# Patient Record
Sex: MALE | Race: WHITE | NOT HISPANIC OR LATINO | Employment: FULL TIME | URBAN - METROPOLITAN AREA
[De-identification: names, ages, dates, MRNs, and addresses within clinical notes are randomized per-mention and may not be internally consistent; named-entity substitution may affect disease eponyms.]

---

## 2024-01-04 ENCOUNTER — OFFICE VISIT (OUTPATIENT)
Dept: UROLOGY | Facility: CLINIC | Age: 44
End: 2024-01-04
Payer: COMMERCIAL

## 2024-01-04 DIAGNOSIS — Z30.09 FAMILY PLANNING: Primary | ICD-10-CM

## 2024-01-04 PROCEDURE — 99202 OFFICE O/P NEW SF 15 MIN: CPT | Performed by: UROLOGY

## 2024-01-04 RX ORDER — DIAZEPAM 5 MG/1
5 TABLET ORAL EVERY 8 HOURS PRN
Qty: 1 TABLET | Refills: 0 | Status: SHIPPED | OUTPATIENT
Start: 2024-01-04 | End: 2024-01-11

## 2024-01-04 NOTE — PROGRESS NOTES
01/04/2023  We discussed scalpeless vasectomy procedure, the indications and potential side effects and complications. Patient expressed understanding and agreed to proceed the procedure.    Plan  Valium 5 mg 1 hour before procedure  Vasectomy    I have personally reviewed the OARRS report  for this patient. This report is scanned into the electronic medical record. I have considered the risks of abuse, dependence, addictions and diversions. I believe that it is a clinically appropriate for this patient to be prescribed this medication      New Patient   Chief Complaint   Patient presents with    Vas Consult        Physical Exam     TODAYS LAB RESULTS:      ASSESSMENT&PLAN:      IMPRESSIONS:

## 2024-02-02 ENCOUNTER — PROCEDURE VISIT (OUTPATIENT)
Dept: UROLOGY | Facility: CLINIC | Age: 44
End: 2024-02-02
Payer: COMMERCIAL

## 2024-02-02 VITALS — DIASTOLIC BLOOD PRESSURE: 87 MMHG | SYSTOLIC BLOOD PRESSURE: 130 MMHG

## 2024-02-02 DIAGNOSIS — Z98.52 STATUS POST VASECTOMY: Primary | ICD-10-CM

## 2024-02-02 PROCEDURE — 55250 REMOVAL OF SPERM DUCT(S): CPT | Performed by: UROLOGY

## 2024-02-02 RX ORDER — CEPHALEXIN 500 MG/1
500 CAPSULE ORAL 2 TIMES DAILY
Qty: 14 CAPSULE | Refills: 0 | Status: SHIPPED | OUTPATIENT
Start: 2024-02-02 | End: 2024-02-09

## 2024-02-02 RX ORDER — HYDROCODONE BITARTRATE AND ACETAMINOPHEN 5; 325 MG/1; MG/1
1 TABLET ORAL EVERY 6 HOURS PRN
Qty: 20 TABLET | Refills: 0 | Status: SHIPPED | OUTPATIENT
Start: 2024-02-02 | End: 2024-02-09

## 2024-02-02 NOTE — PROGRESS NOTES
02/02/2024    A scalpeless vasectomy was performed under local and patient tolerated it well.    Impression  Family planning  Status post vasectomy    Plan  Norco ×20  Keflex 500 mg twice daily ×7 days  Appointment with specimen in 10 weeks    I have personally reviewed the OARRS report for this patient. This report is scanned into the electronic medical record. I have considered the risk of abuse, dependence, addictions and diversion. I believe that it is clinically appropriate for this patient to be prescribed this medication        Chief Complaint   Patient presents with    Sterilization        Physical Exam     TODAYS LAB RESULTS:      ASSESSMENT&PLAN:      IMPRESSIONS:       01/04/2023  We discussed scalpeless vasectomy procedure, the indications and potential side effects and complications. Patient expressed understanding and agreed to proceed the procedure.     Plan  Valium 5 mg 1 hour before procedure  Vasectomy      Admission

## 2024-04-19 ENCOUNTER — OFFICE VISIT (OUTPATIENT)
Dept: UROLOGY | Facility: CLINIC | Age: 44
End: 2024-04-19
Payer: COMMERCIAL

## 2024-04-19 DIAGNOSIS — Z98.52 STATUS POST VASECTOMY: Primary | ICD-10-CM

## 2024-04-19 PROCEDURE — 99024 POSTOP FOLLOW-UP VISIT: CPT | Performed by: UROLOGY

## 2024-04-19 NOTE — PROGRESS NOTES
04/19/2024  Patient did fine    Exam: Incision healed    First Semen specimen: no sperm    Plan:  Drop off a second semen specimen in a week    Chief Complaint   Patient presents with    Post-op     Patient here for 10 wk vasectomy follow-up. His first semen sample was placed under the microscope. He is recovering well.         Physical Exam     TODAYS LAB RESULTS:      ASSESSMENT&PLAN:      IMPRESSIONS:         02/02/2024     A scalpeless vasectomy was performed under local and patient tolerated it well.     Impression  Family planning  Status post vasectomy     Plan  Norco ×20  Keflex 500 mg twice daily ×7 days  Appointment with specimen in 10 weeks     I have personally reviewed the OARRS report for this patient. This report is scanned into the electronic medical record. I have considered the risk of abuse, dependence, addictions and diversion. I believe that it is clinically appropriate for this patient to be prescribed this medication               Chief Complaint   Patient presents with    Sterilization         Physical Exam      TODAYS LAB RESULTS:        ASSESSMENT&PLAN:        IMPRESSIONS:        01/04/2023  We discussed scalpeless vasectomy procedure, the indications and potential side effects and complications. Patient expressed understanding and agreed to proceed the procedure.     Plan  Valium 5 mg 1 hour before procedure  Vasectomy

## 2024-04-26 ENCOUNTER — APPOINTMENT (OUTPATIENT)
Dept: UROLOGY | Facility: CLINIC | Age: 44
End: 2024-04-26
Payer: COMMERCIAL

## 2024-04-29 ENCOUNTER — OFFICE VISIT (OUTPATIENT)
Dept: UROLOGY | Facility: CLINIC | Age: 44
End: 2024-04-29
Payer: COMMERCIAL

## 2024-04-29 DIAGNOSIS — Z98.52 STATUS POST VASECTOMY: Primary | ICD-10-CM

## 2024-04-29 PROCEDURE — 99024 POSTOP FOLLOW-UP VISIT: CPT | Performed by: UROLOGY

## 2024-04-29 NOTE — PROGRESS NOTES
04/29/2024  Second semen specimen: No sperm    Successful vasectomy    Chief Complaint   Patient presents with    Post-op     Second semen sample was place under the microscope.         Physical Exam     TODAYS LAB RESULTS:      ASSESSMENT&PLAN:      IMPRESSIONS:         04/19/2024  Patient did fine     Exam: Incision healed     First Semen specimen: no sperm     Plan:  Drop off a second semen specimen in a week          Chief Complaint   Patient presents with    Post-op       Patient here for 10 wk vasectomy follow-up. His first semen sample was placed under the microscope. He is recovering well.          Physical Exam      TODAYS LAB RESULTS:        ASSESSMENT&PLAN:        IMPRESSIONS:          02/02/2024     A scalpeless vasectomy was performed under local and patient tolerated it well.     Impression  Family planning  Status post vasectomy     Plan  Norco ×20  Keflex 500 mg twice daily ×7 days  Appointment with specimen in 10 weeks

## 2024-05-13 ENCOUNTER — TELEPHONE (OUTPATIENT)
Dept: UROLOGY | Facility: CLINIC | Age: 44
End: 2024-05-13
Payer: COMMERCIAL

## 2024-08-26 ENCOUNTER — HOSPITAL ENCOUNTER (EMERGENCY)
Facility: HOSPITAL | Age: 44
Discharge: HOME | End: 2024-08-27
Attending: EMERGENCY MEDICINE
Payer: COMMERCIAL

## 2024-08-26 ENCOUNTER — APPOINTMENT (OUTPATIENT)
Dept: RADIOLOGY | Facility: HOSPITAL | Age: 44
End: 2024-08-26
Payer: COMMERCIAL

## 2024-08-26 ENCOUNTER — APPOINTMENT (OUTPATIENT)
Dept: CARDIOLOGY | Facility: HOSPITAL | Age: 44
End: 2024-08-26
Payer: COMMERCIAL

## 2024-08-26 DIAGNOSIS — R91.8 PULMONARY NODULES: ICD-10-CM

## 2024-08-26 DIAGNOSIS — K62.5 RECTAL BLEEDING: ICD-10-CM

## 2024-08-26 DIAGNOSIS — R10.13 ABDOMINAL PAIN, EPIGASTRIC: Primary | ICD-10-CM

## 2024-08-26 LAB
ALBUMIN SERPL BCP-MCNC: 4.6 G/DL (ref 3.4–5)
ALP SERPL-CCNC: 49 U/L (ref 33–120)
ALT SERPL W P-5'-P-CCNC: 12 U/L (ref 10–52)
ANION GAP SERPL CALC-SCNC: 13 MMOL/L (ref 10–20)
APPEARANCE UR: CLEAR
AST SERPL W P-5'-P-CCNC: 18 U/L (ref 9–39)
BASOPHILS # BLD AUTO: 0.05 X10*3/UL (ref 0–0.1)
BASOPHILS NFR BLD AUTO: 0.5 %
BILIRUB SERPL-MCNC: 1 MG/DL (ref 0–1.2)
BILIRUB UR STRIP.AUTO-MCNC: NEGATIVE MG/DL
BUN SERPL-MCNC: 8 MG/DL (ref 6–23)
CALCIUM SERPL-MCNC: 9.3 MG/DL (ref 8.6–10.3)
CARDIAC TROPONIN I PNL SERPL HS: <3 NG/L (ref 0–20)
CHLORIDE SERPL-SCNC: 98 MMOL/L (ref 98–107)
CO2 SERPL-SCNC: 25 MMOL/L (ref 21–32)
COLOR UR: ABNORMAL
CREAT SERPL-MCNC: 0.85 MG/DL (ref 0.5–1.3)
CRP SERPL-MCNC: <0.1 MG/DL
EGFRCR SERPLBLD CKD-EPI 2021: >90 ML/MIN/1.73M*2
EOSINOPHIL # BLD AUTO: 0.04 X10*3/UL (ref 0–0.7)
EOSINOPHIL NFR BLD AUTO: 0.4 %
ERYTHROCYTE [DISTWIDTH] IN BLOOD BY AUTOMATED COUNT: 13.7 % (ref 11.5–14.5)
GLUCOSE SERPL-MCNC: 91 MG/DL (ref 74–99)
GLUCOSE UR STRIP.AUTO-MCNC: NORMAL MG/DL
HCT VFR BLD AUTO: 40.2 % (ref 41–52)
HGB BLD-MCNC: 13.1 G/DL (ref 13.5–17.5)
IMM GRANULOCYTES # BLD AUTO: 0.02 X10*3/UL (ref 0–0.7)
IMM GRANULOCYTES NFR BLD AUTO: 0.2 % (ref 0–0.9)
KETONES UR STRIP.AUTO-MCNC: ABNORMAL MG/DL
LEUKOCYTE ESTERASE UR QL STRIP.AUTO: NEGATIVE
LIPASE SERPL-CCNC: 12 U/L (ref 9–82)
LYMPHOCYTES # BLD AUTO: 0.97 X10*3/UL (ref 1.2–4.8)
LYMPHOCYTES NFR BLD AUTO: 10.3 %
MCH RBC QN AUTO: 28.4 PG (ref 26–34)
MCHC RBC AUTO-ENTMCNC: 32.6 G/DL (ref 32–36)
MCV RBC AUTO: 87 FL (ref 80–100)
MONOCYTES # BLD AUTO: 0.64 X10*3/UL (ref 0.1–1)
MONOCYTES NFR BLD AUTO: 6.8 %
NEUTROPHILS # BLD AUTO: 7.71 X10*3/UL (ref 1.2–7.7)
NEUTROPHILS NFR BLD AUTO: 81.8 %
NITRITE UR QL STRIP.AUTO: NEGATIVE
NRBC BLD-RTO: 0 /100 WBCS (ref 0–0)
PH UR STRIP.AUTO: 6 [PH]
PLATELET # BLD AUTO: 343 X10*3/UL (ref 150–450)
POTASSIUM SERPL-SCNC: 4.1 MMOL/L (ref 3.5–5.3)
PROT SERPL-MCNC: 7.2 G/DL (ref 6.4–8.2)
PROT UR STRIP.AUTO-MCNC: NEGATIVE MG/DL
RBC # BLD AUTO: 4.61 X10*6/UL (ref 4.5–5.9)
RBC # UR STRIP.AUTO: NEGATIVE /UL
SODIUM SERPL-SCNC: 132 MMOL/L (ref 136–145)
SP GR UR STRIP.AUTO: 1.01
UROBILINOGEN UR STRIP.AUTO-MCNC: NORMAL MG/DL
WBC # BLD AUTO: 9.4 X10*3/UL (ref 4.4–11.3)

## 2024-08-26 PROCEDURE — 85025 COMPLETE CBC W/AUTO DIFF WBC: CPT | Performed by: EMERGENCY MEDICINE

## 2024-08-26 PROCEDURE — 84484 ASSAY OF TROPONIN QUANT: CPT | Performed by: EMERGENCY MEDICINE

## 2024-08-26 PROCEDURE — 96374 THER/PROPH/DIAG INJ IV PUSH: CPT | Mod: 59

## 2024-08-26 PROCEDURE — 74177 CT ABD & PELVIS W/CONTRAST: CPT

## 2024-08-26 PROCEDURE — 81003 URINALYSIS AUTO W/O SCOPE: CPT | Performed by: EMERGENCY MEDICINE

## 2024-08-26 PROCEDURE — 83690 ASSAY OF LIPASE: CPT | Performed by: EMERGENCY MEDICINE

## 2024-08-26 PROCEDURE — 99285 EMERGENCY DEPT VISIT HI MDM: CPT | Mod: 25

## 2024-08-26 PROCEDURE — 74177 CT ABD & PELVIS W/CONTRAST: CPT | Performed by: RADIOLOGY

## 2024-08-26 PROCEDURE — 36415 COLL VENOUS BLD VENIPUNCTURE: CPT | Performed by: EMERGENCY MEDICINE

## 2024-08-26 PROCEDURE — 86140 C-REACTIVE PROTEIN: CPT | Performed by: EMERGENCY MEDICINE

## 2024-08-26 PROCEDURE — 80053 COMPREHEN METABOLIC PANEL: CPT | Performed by: EMERGENCY MEDICINE

## 2024-08-26 PROCEDURE — 93005 ELECTROCARDIOGRAM TRACING: CPT

## 2024-08-26 PROCEDURE — 2550000001 HC RX 255 CONTRASTS: Performed by: EMERGENCY MEDICINE

## 2024-08-26 PROCEDURE — 2500000004 HC RX 250 GENERAL PHARMACY W/ HCPCS (ALT 636 FOR OP/ED): Performed by: EMERGENCY MEDICINE

## 2024-08-26 RX ORDER — PANTOPRAZOLE SODIUM 40 MG/10ML
40 INJECTION, POWDER, LYOPHILIZED, FOR SOLUTION INTRAVENOUS ONCE
Status: COMPLETED | OUTPATIENT
Start: 2024-08-26 | End: 2024-08-26

## 2024-08-26 ASSESSMENT — PAIN DESCRIPTION - DESCRIPTORS: DESCRIPTORS: PRESSURE

## 2024-08-26 ASSESSMENT — PAIN DESCRIPTION - LOCATION: LOCATION: ABDOMEN

## 2024-08-26 ASSESSMENT — PAIN SCALES - GENERAL: PAINLEVEL_OUTOF10: 5 - MODERATE PAIN

## 2024-08-26 ASSESSMENT — PAIN - FUNCTIONAL ASSESSMENT: PAIN_FUNCTIONAL_ASSESSMENT: 0-10

## 2024-08-26 ASSESSMENT — COLUMBIA-SUICIDE SEVERITY RATING SCALE - C-SSRS
2. HAVE YOU ACTUALLY HAD ANY THOUGHTS OF KILLING YOURSELF?: NO
6. HAVE YOU EVER DONE ANYTHING, STARTED TO DO ANYTHING, OR PREPARED TO DO ANYTHING TO END YOUR LIFE?: NO
1. IN THE PAST MONTH, HAVE YOU WISHED YOU WERE DEAD OR WISHED YOU COULD GO TO SLEEP AND NOT WAKE UP?: NO

## 2024-08-26 ASSESSMENT — PAIN DESCRIPTION - PAIN TYPE: TYPE: ACUTE PAIN

## 2024-08-26 ASSESSMENT — PAIN DESCRIPTION - ORIENTATION: ORIENTATION: RIGHT

## 2024-08-26 ASSESSMENT — PAIN DESCRIPTION - FREQUENCY: FREQUENCY: CONSTANT/CONTINUOUS

## 2024-08-27 VITALS
TEMPERATURE: 97.5 F | RESPIRATION RATE: 16 BRPM | WEIGHT: 180 LBS | OXYGEN SATURATION: 95 % | SYSTOLIC BLOOD PRESSURE: 126 MMHG | HEIGHT: 69 IN | BODY MASS INDEX: 26.66 KG/M2 | DIASTOLIC BLOOD PRESSURE: 85 MMHG | HEART RATE: 63 BPM

## 2024-08-27 LAB — HOLD SPECIMEN: NORMAL

## 2024-08-27 RX ORDER — PANTOPRAZOLE SODIUM 20 MG/1
40 TABLET, DELAYED RELEASE ORAL DAILY
Qty: 20 TABLET | Refills: 0 | Status: SHIPPED | OUTPATIENT
Start: 2024-08-27 | End: 2024-09-26

## 2024-08-27 NOTE — ED PROVIDER NOTES
"Department of Emergency Medicine   ED  Provider Note  Admit Date/RoomTime: 8/26/2024  9:00 PM  ED Room: H02/H02                  History of Present Illness:   Maciej Felipe is a 44 y.o. male presenting to the ED for right upper quadrant/right-sided abdominal pain, beginning intermittently on Wednesday.  More or less constant since early this morning.  He has had no nausea or vomiting.  No diarrhea.  He does report that he had 2 \"bloody stools\" this evening prior to coming to the emergency room..  The complaint has been persistent, moderate in severity, and worsened by nothing.  No dysuria urgency frequency hematuria.  No prior abdominal surgeries.      Review of Systems:   Pertinent positives and review of systems as noted above.  Remaining 10 review of systems is negative or noncontributory to today's episode of care.  Review of Systems   A complete review of systems is otherwise negative except as noted above.    --------------------------------------------- PAST HISTORY ---------------------------------------------  Past Medical History:  has a past medical history of Diseases of lips (01/02/2019) and Diseases of lips (01/02/2019).    Past Surgical History:  has no past surgical history on file.  Previous vasectomy.  No abdominal surgeries.    Social History:  reports that he has been smoking cigarettes. He has never used smokeless tobacco.  He does smoke about three quarters of a pack to 1 pack of cigarettes per day.  He uses social alcohol nightly about 4-5 drinks per night.  He denies marijuana or recreational drug use.    Family History: family history is not on file. Unless otherwise noted, family history is non contributory    Patient's Medications   New Prescriptions    PANTOPRAZOLE (PROTONIX) 20 MG EC TABLET    Take 2 tablets (40 mg) by mouth once daily. Do not crush, chew, or split.   Previous Medications    DIAZEPAM (VALIUM) 5 MG TABLET    Take 1 tablet (5 mg) by mouth every 8 hours if needed for anxiety " for up to 7 days.   Modified Medications    No medications on file   Discontinued Medications    No medications on file      The patient’s home medications have been reviewed.    Allergies: Shellfish derived    -------------------------------------------------- RESULTS -------------------------------------------------  All laboratory and radiology results have been personally reviewed by myself   LABS:  Labs Reviewed   CBC WITH AUTO DIFFERENTIAL - Abnormal       Result Value    WBC 9.4      nRBC 0.0      RBC 4.61      Hemoglobin 13.1 (*)     Hematocrit 40.2 (*)     MCV 87      MCH 28.4      MCHC 32.6      RDW 13.7      Platelets 343      Neutrophils % 81.8      Immature Granulocytes %, Automated 0.2      Lymphocytes % 10.3      Monocytes % 6.8      Eosinophils % 0.4      Basophils % 0.5      Neutrophils Absolute 7.71 (*)     Immature Granulocytes Absolute, Automated 0.02      Lymphocytes Absolute 0.97 (*)     Monocytes Absolute 0.64      Eosinophils Absolute 0.04      Basophils Absolute 0.05     COMPREHENSIVE METABOLIC PANEL - Abnormal    Glucose 91      Sodium 132 (*)     Potassium 4.1      Chloride 98      Bicarbonate 25      Anion Gap 13      Urea Nitrogen 8      Creatinine 0.85      eGFR >90      Calcium 9.3      Albumin 4.6      Alkaline Phosphatase 49      Total Protein 7.2      AST 18      Bilirubin, Total 1.0      ALT 12     URINALYSIS WITH REFLEX CULTURE AND MICROSCOPIC - Abnormal    Color, Urine Light-Yellow      Appearance, Urine Clear      Specific Gravity, Urine 1.008      pH, Urine 6.0      Protein, Urine NEGATIVE      Glucose, Urine Normal      Blood, Urine NEGATIVE      Ketones, Urine 20 (1+) (*)     Bilirubin, Urine NEGATIVE      Urobilinogen, Urine Normal      Nitrite, Urine NEGATIVE      Leukocyte Esterase, Urine NEGATIVE     LIPASE - Normal    Lipase 12      Narrative:     Venipuncture immediately after or during the administration of Metamizole may lead to falsely low results. Testing should be  performed immediately prior to Metamizole dosing.   C-REACTIVE PROTEIN - Normal    C-Reactive Protein <0.10     TROPONIN I, HIGH SENSITIVITY - Normal    Troponin I, High Sensitivity <3      Narrative:     Less than 99th percentile of normal range cutoff-  Female and children under 18 years old <14 ng/L; Male <21 ng/L: Negative  Repeat testing should be performed if clinically indicated.     Female and children under 18 years old 14-50 ng/L; Male 21-50 ng/L:  Consistent with possible cardiac damage and possible increased clinical   risk. Serial measurements may help to assess extent of myocardial damage.     >50 ng/L: Consistent with cardiac damage, increased clinical risk and  myocardial infarction. Serial measurements may help assess extent of   myocardial damage.      NOTE: Children less than 1 year old may have higher baseline troponin   levels and results should be interpreted in conjunction with the overall   clinical context.     NOTE: Troponin I testing is performed using a different   testing methodology at [facility] than at other   [facility]. Direct result comparisons should only   be made within the same method.   URINALYSIS WITH REFLEX CULTURE AND MICROSCOPIC    Narrative:     The following orders were created for panel order Urinalysis with Reflex Culture and Microscopic.  Procedure                               Abnormality         Status                     ---------                               -----------         ------                     Urinalysis with Reflex C...[[id number]]  Abnormal            Final result               Extra Urine Gray Tube[[id number]]                            In process                   Please view results for these tests on the individual orders.   EXTRA URINE GRAY TUBE         RADIOLOGY:  Interpreted by Radiologist.  CT abdomen pelvis w IV contrast   Final Result   1. Evaluation degraded by motion artifact. Circumferential wall   thickening of the  "urinary bladder, may be secondary to   underdistention. Please correlate with urinalysis to exclude   superimposed cystitis. Otherwise, no acute findings.   2. Noncalcified pulmonary nodules at the right middle lobe measuring   up to 5 mm. No further follow-up is required, however, if the patient   has high risk factors for primary lung malignancy, follow-up   noncontrast CT scan chest in 12 months may be obtained. ([place]   MacMahon et al., Guidelines for management of incidental pulmonary   nodules detected on CT images: From the Fleischner Society 2017,   Radiology. 2017 Jul;284 (1):228-243.) [email or web address]             MACRO:   Incidental Finding:  A non-calcified pulmonary nodule/multiple   non-calcified pulmonary nodules measuring less than 6 mm, likely   benign.  (**-YCF-**)        Instructions:  No further follow-up is required, however, if the   patient has high risk factors for primary lung malignancy, follow-up   noncontrast CT scan chest in 12 months may be obtained. ([place]   MacMahon et al., Guidelines for management of incidental pulmonary   nodules detected on CT images: From the Fleischner Society 2017,   Radiology. 2017 Jul;284 (1):228-243.) [email or web address]        Signed by: Tresa Walker 8/26/2024 11:23 PM   Dictation workstation:   [id number]          No results found for this or any previous visit (from the past 4464 hour(s)).  ------------------------- NURSING NOTES AND VITALS REVIEWED ---------------------------   The nursing notes within the ED encounter and vital signs as below have been reviewed.   BP (!) 169/93 (BP Location: Left arm, Patient Position: Sitting)   Pulse 78   Temp 36.4 °C (97.5 °F) (Tympanic)   Resp 14   Ht 1.753 m (5' 9\")   Wt 81.6 kg (180 lb)   SpO2 98%   BMI 26.58 kg/m²   Oxygen Saturation Interpretation: Normal      ---------------------------------------------------PHYSICAL EXAM--------------------------------------  Physical Exam  Vitals and nursing " note reviewed.   Constitutional:       General: He is not in acute distress.     Appearance: He is well-developed. He is not ill-appearing or toxic-appearing.   HENT:      Head: Normocephalic and atraumatic.      Mouth/Throat:      Mouth: Mucous membranes are moist.      Pharynx: Oropharynx is clear.   Eyes:      Extraocular Movements: Extraocular movements intact.      Conjunctiva/sclera: Conjunctivae normal.      Pupils: Pupils are equal, round, and reactive to light.   Cardiovascular:      Rate and Rhythm: Normal rate and regular rhythm.      Heart sounds: Normal heart sounds. No murmur heard.     No friction rub. No gallop.   Pulmonary:      Effort: Pulmonary effort is normal. No respiratory distress.      Breath sounds: Normal breath sounds. No stridor. No wheezing, rhonchi or rales.   Chest:      Chest wall: No tenderness.   Abdominal:      General: Bowel sounds are normal. There is no distension or abdominal bruit. There are no signs of injury.      Palpations: Abdomen is soft. There is no shifting dullness, fluid wave, hepatomegaly, splenomegaly, mass or pulsatile mass.      Tenderness: There is abdominal tenderness in the right upper quadrant and epigastric area. There is no right CVA tenderness, left CVA tenderness, guarding or rebound. Negative signs include Brandt's sign, Rovsing's sign, McBurney's sign and psoas sign.   Musculoskeletal:         General: No swelling.      Cervical back: Neck supple.   Skin:     General: Skin is warm and dry.      Capillary Refill: Capillary refill takes less than 2 seconds.      Findings: No rash.   Neurological:      Mental Status: He is alert and oriented to person, place, and time.   Psychiatric:         Mood and Affect: Mood normal.            Procedures  NONE  ------------------------------ ED COURSE/MEDICAL DECISION MAKING----------------------    Medical Decision Making:   Patient was seen and examined by me.  An IV was started appropriate labs are drawn  A CT of  the abdomen pelvis was ordered.  Patient was given a dose of IV Protonix 40 mg.  Patient declined pain medication at this time.      ED Course as of 08/27/24 0023   Mon Aug 26, 2024   2324 An EKG at 2244 interpreted by me.  Normal sinus rhythm 70 bpm.  Normal this.  IA, QRS, QT intervals are normal.  No acute ST-T change.  No STEMI. [EC]   2325 CBC with Differential(!)  CBC revealed white count to be normal 9.4, hemoglobin 13.1 hematocrit 40.2, platelet count 343 [EC]   2325 Comprehensive Metabolic Panel(!)  Comprehensive metabolic panel is unremarkable. [EC]   2325 Urinalysis with Reflex Culture and Microscopic(!)  Urinalysis is negative for infection. [EC]   2325 Troponin I, High Sensitivity  Troponin is normal at less than 3  C-reactive protein is less than 0.10  Lipase is normal at 12 [EC]   2327 CT of the abdomen pelvis  IMPRESSION:  1. Evaluation degraded by motion artifact. Circumferential wall  thickening of the urinary bladder, may be secondary to  underdistention. Please correlate with urinalysis to exclude  superimposed cystitis. Otherwise, no acute findings.  2. Noncalcified pulmonary nodules at the right middle lobe measuring  up to 5 mm. No further follow-up is required, however, if the patient  has high risk factors for primary lung malignancy, follow-up  noncontrast CT scan chest in 12 months may be obtained. (Laurent De La Fuente et al., Guidelines for management of incidental pulmonary  nodules detected on CT images: From the Fleischner Society 2017,  Radiology. 2017 Jul;284 (1):228-243.) [email or web address]          MACRO:  Incidental Finding:  A non-calcified pulmonary nodule/multiple  non-calcified pulmonary nodules measuring less than 6 mm, likely  benign.  (**-YCF-**)      Instructions:  No further follow-up is required, however, if the  patient has high risk factors for primary lung malignancy, follow-up  noncontrast CT scan chest in 12 months may be obtained. (Laurent De La Fuente et al., Guidelines  for management of incidental pulmonary  nodules detected on CT images: From the Fleischner Society 2017,  Radiology. 2017 Jul;284 (1):228-243.) [email or web address]   [EC]   Tue Aug 27, 2024   0021 I have reviewed all laboratory findings as well as CT findings with the patient and patient's spouse.    The patient was discharged home in improved and stable condition.  Rx pantoprazole 40 mg daily  [facility]  Follow-up with general surgery Dr. Hernandez or Dr Flores in 3-7 days to consider endoscopy.    They were informed about the pulmonary nodules and need to follow-up.  They were informed that if his condition worsens if he develops new worrisome symptoms he should immediately return. [EC]      ED Course User Index  [EC] Marques Alejandro DO         Diagnoses as of 08/27/24 0023   Abdominal pain, epigastric   Rectal bleeding   Pulmonary nodules      Counseling:   The emergency provider has spoken with the patient and wife and discussed today’s results, in addition to providing specific details for the plan of care and counseling regarding the diagnosis and prognosis.  Questions are answered at this time and they are agreeable with the plan.      --------------------------------- IMPRESSION AND DISPOSITION ---------------------------------        IMPRESSION  1. Abdominal pain, epigastric    2. Rectal bleeding    3. Pulmonary nodules        DISPOSITION  Disposition: Discharge to home  Patient condition is fair      Billing Provider Critical Care Time: 0 minutes     Marques Alejandro DO  08/27/24 0023

## 2024-08-27 NOTE — DISCHARGE INSTRUCTIONS
Lab work is fairly stable  CT imaging shows no evidence of acute surgical problem.  CT does show some nonspecific what appear to be benign pulmonary nodules,  consider repeat CT in 6 months.  Consider follow up with Surgeon for endoscopy.  [facility] your [facility] care doctor in next 3-7 days.  RETURN IF ACUTELY WORSE OR NEW WORRISOME SYMPTOMS

## 2024-08-28 LAB
ATRIAL RATE: 78 BPM
P AXIS: 41 DEGREES
P OFFSET: 191 MS
P ONSET: 125 MS
PR INTERVAL: 182 MS
Q ONSET: 216 MS
QRS COUNT: 13 BEATS
QRS DURATION: 102 MS
QT INTERVAL: 406 MS
QTC CALCULATION(BAZETT): 462 MS
QTC FREDERICIA: 443 MS
R AXIS: 69 DEGREES
T AXIS: 50 DEGREES
T OFFSET: 419 MS
VENTRICULAR RATE: 78 BPM

## 2024-08-29 NOTE — PROGRESS NOTES
"Subjective   Patient ID: Maciej Felipe is a 44 y.o. male who presents for Establish Care.    HPI   Pt here to establish.  Was seen in [place] ER on 8/26 for epigastric/RUQ abdominal pain w/o n/v.  He did say he had 2 bloody stools prior to coming to the ER.  Labs normal as well as abd ct though it did see RML nodules and rec f/u chest ct in 1 year.  Pt is a smoker and drinks 4-5 etoh drinks/beer a night.  He was put on protonix 40 mg and referred to general surgery for endoscopy but says he wants a referral to GI. He finished the PPI last week end and now after a week off does notice the bloating/discomfort;  has stopped the nsaid and has backed off the etoh.  He started fiber and his constipation has improved: no bloody stool since visit.    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      pantoprazole (ProtoNix) 40 mg EC tablet Take 1 tablet (40 mg) by mouth once daily. Do not crush, chew, or split. 90 tablet 0     No current facility-administered medications for this visit.       Review of Systems see hpi    Objective   /72   Pulse 76   Ht 1.753 m (5' 9\")   Wt 85.9 kg (189 lb 6.4 oz)   SpO2 99%   BMI 27.97 kg/m²     Physical Exam  Vitals reviewed.   Constitutional:       Appearance: Normal appearance.   HENT:      Head: Normocephalic and atraumatic.   Cardiovascular:      Rate and Rhythm: Normal rate and regular rhythm.      Heart sounds: Normal heart sounds.   Pulmonary:      Effort: Pulmonary effort is normal.      Breath sounds: Normal breath sounds.   Abdominal:      General: Abdomen is flat. There is no distension.      Palpations: There is no mass.      Tenderness: There is no abdominal tenderness. There is no guarding or rebound.   Neurological:      Mental Status: He " is alert.       Maciej was seen today for establish care.  Diagnoses and all orders for this visit:  Upper abdominal pain (Primary)  Comments:  cont to avoid nsaids; will renew protonix  Orders:  -     Referral to Gastroenterology; Future  -     pantoprazole (ProtoNix) 40 mg EC tablet; Take 1 tablet (40 mg) by mouth once daily. Do not crush, chew, or split.  Bloody stool  Comments:  most likely due to constipation/straining; cont fiber supplements  Orders:  -     Referral to Gastroenterology; Future

## 2024-09-13 ENCOUNTER — OFFICE VISIT (OUTPATIENT)
Dept: PRIMARY CARE | Facility: CLINIC | Age: 44
End: 2024-09-13
Payer: COMMERCIAL

## 2024-09-13 VITALS
HEIGHT: 69 IN | OXYGEN SATURATION: 99 % | WEIGHT: 189.4 LBS | SYSTOLIC BLOOD PRESSURE: 126 MMHG | BODY MASS INDEX: 28.05 KG/M2 | DIASTOLIC BLOOD PRESSURE: 72 MMHG | HEART RATE: 76 BPM

## 2024-09-13 DIAGNOSIS — K92.1 BLOODY STOOL: ICD-10-CM

## 2024-09-13 DIAGNOSIS — R10.10 UPPER ABDOMINAL PAIN: Primary | ICD-10-CM

## 2024-09-13 PROCEDURE — 3008F BODY MASS INDEX DOCD: CPT | Performed by: FAMILY MEDICINE

## 2024-09-13 PROCEDURE — 99203 OFFICE O/P NEW LOW 30 MIN: CPT | Performed by: FAMILY MEDICINE

## 2024-09-13 PROCEDURE — 4004F PT TOBACCO SCREEN RCVD TLK: CPT | Performed by: FAMILY MEDICINE

## 2024-09-13 RX ORDER — PANTOPRAZOLE SODIUM 40 MG/1
40 TABLET, DELAYED RELEASE ORAL DAILY
Qty: 90 TABLET | Refills: 0 | Status: SHIPPED | OUTPATIENT
Start: 2024-09-13 | End: 2024-12-12

## 2024-09-13 RX ORDER — PSYLLIUM HUSK 0.4 G
5 CAPSULE ORAL 4 TIMES DAILY
COMMUNITY

## 2024-09-13 ASSESSMENT — ENCOUNTER SYMPTOMS
LOSS OF SENSATION IN FEET: 0
DEPRESSION: 0
OCCASIONAL FEELINGS OF UNSTEADINESS: 0

## 2024-09-13 ASSESSMENT — PATIENT HEALTH QUESTIONNAIRE - PHQ9
SUM OF ALL RESPONSES TO PHQ9 QUESTIONS 1 AND 2: 0
2. FEELING DOWN, DEPRESSED OR HOPELESS: NOT AT ALL
1. LITTLE INTEREST OR PLEASURE IN DOING THINGS: NOT AT ALL

## 2024-09-13 ASSESSMENT — PAIN SCALES - GENERAL: PAINLEVEL: 0-NO PAIN

## 2024-09-18 ENCOUNTER — LAB (OUTPATIENT)
Dept: LAB | Facility: LAB | Age: 44
End: 2024-09-18
Payer: COMMERCIAL

## 2024-09-18 DIAGNOSIS — D64.9 ANEMIA, UNSPECIFIED: Primary | ICD-10-CM

## 2024-09-18 LAB
FERRITIN SERPL-MCNC: 21 NG/ML (ref 20–300)
IRON SATN MFR SERPL: 13 % (ref 25–45)
IRON SERPL-MCNC: 57 UG/DL (ref 35–150)
TIBC SERPL-MCNC: 444 UG/DL (ref 240–445)
TSH SERPL-ACNC: 1.99 MIU/L (ref 0.44–3.98)
UIBC SERPL-MCNC: 387 UG/DL (ref 110–370)

## 2024-09-18 PROCEDURE — 82746 ASSAY OF FOLIC ACID SERUM: CPT

## 2024-09-18 PROCEDURE — 36415 COLL VENOUS BLD VENIPUNCTURE: CPT

## 2024-09-18 PROCEDURE — 82728 ASSAY OF FERRITIN: CPT

## 2024-09-18 PROCEDURE — 82607 VITAMIN B-12: CPT

## 2024-09-18 PROCEDURE — 83540 ASSAY OF IRON: CPT

## 2024-09-18 PROCEDURE — 84443 ASSAY THYROID STIM HORMONE: CPT

## 2024-09-18 PROCEDURE — 83550 IRON BINDING TEST: CPT

## 2024-09-19 LAB
FOLATE SERPL-MCNC: 21.6 NG/ML
VIT B12 SERPL-MCNC: 249 PG/ML (ref 211–911)

## 2024-12-10 DIAGNOSIS — R10.10 UPPER ABDOMINAL PAIN: ICD-10-CM

## 2024-12-10 NOTE — TELEPHONE ENCOUNTER
LOV:  9/13/24         NEXT OV:   Not yet scheduled                         LAST FILL:  9/13/24 for 90 days, no refills                         LABS:  UTD    Confirmed  [facility] in [place]

## 2024-12-11 RX ORDER — PANTOPRAZOLE SODIUM 20 MG/1
20 TABLET, DELAYED RELEASE ORAL DAILY
Qty: 90 TABLET | Refills: 1 | Status: SHIPPED | OUTPATIENT
Start: 2024-12-11 | End: 2025-06-09

## 2025-02-24 ENCOUNTER — OFFICE VISIT (OUTPATIENT)
Dept: URGENT CARE | Facility: URGENT CARE | Age: 45
End: 2025-02-24
Payer: COMMERCIAL

## 2025-02-24 VITALS
DIASTOLIC BLOOD PRESSURE: 90 MMHG | HEIGHT: 69 IN | WEIGHT: 205.03 LBS | TEMPERATURE: 98.1 F | HEART RATE: 80 BPM | SYSTOLIC BLOOD PRESSURE: 135 MMHG | OXYGEN SATURATION: 99 % | BODY MASS INDEX: 30.37 KG/M2 | RESPIRATION RATE: 20 BRPM

## 2025-02-24 DIAGNOSIS — J01.00 ACUTE NON-RECURRENT MAXILLARY SINUSITIS: Primary | ICD-10-CM

## 2025-02-24 PROCEDURE — 3008F BODY MASS INDEX DOCD: CPT | Performed by: PHYSICIAN ASSISTANT

## 2025-02-24 PROCEDURE — 99070 SPECIAL SUPPLIES PHYS/QHP: CPT | Performed by: PHYSICIAN ASSISTANT

## 2025-02-24 PROCEDURE — 99204 OFFICE O/P NEW MOD 45 MIN: CPT | Performed by: PHYSICIAN ASSISTANT

## 2025-02-24 RX ORDER — AMOXICILLIN AND CLAVULANATE POTASSIUM 875; 125 MG/1; MG/1
875 TABLET, FILM COATED ORAL 2 TIMES DAILY
Qty: 20 TABLET | Refills: 0 | Status: SHIPPED | OUTPATIENT
Start: 2025-02-24

## 2025-02-24 ASSESSMENT — ENCOUNTER SYMPTOMS
LOSS OF SENSATION IN FEET: 0
DEPRESSION: 0
OCCASIONAL FEELINGS OF UNSTEADINESS: 0

## 2025-02-24 NOTE — PATIENT INSTRUCTIONS
Acute sinusitis- Start Augmentin twice a day x 10 days; take with food and probiotic to decrease upset stomach.   Recommend warm salt water gargles, saline nasal spray every 2 hours as needed congestion, Humidifier, Vicks Chest RUB, OTC expectorant such as Robitussin or Mucinex  with plenty of fluids, Hydration with Pedialyte or water, Trial of Flonase nasal spray 2 sprays each nostril daily or 1 spray twice a day x 3-7 days and zyrtec 10mg daily x 1 week  Go to ER if chest pain or difficulty breathing or dizziness or fever 103 with headache or neck stiffness.      
reactive/round

## 2025-02-24 NOTE — PROGRESS NOTES
"Subjective   Patient ID: Maciej Felipe is a 44 y.o. male present today with a chief complaint of Nasal Congestion (Nasal congestion for past 4 weeks that is not clearing up ).    History of Present Illness  HPI  Pt reports nasal congestion and thick drainage  x 4 weeks, brief nose bleed 5 days ago. no fever. Mild cough. No wheeze. No dyspnea. Also intermittent ear plugged. Sinus pressure in forehead. He tried vicks, nasal spray and mucinex with no relief. Pt reports hx of sinus infection many years ago. Smoker 1ppd, 20 years, not ready to quit.     Past Medical History  Allergies as of 02/24/2025 - Reviewed 02/24/2025   Allergen Reaction Noted    Shellfish derived Cough, Dizziness, Hives, Itching, Rash, Shortness of breath, and Swelling 01/04/2024       (Not in a hospital admission)       Past Medical History:   Diagnosis Date    Lung nodules 08/2024    RML-ketty 1 yr chest ct w/o contrast 8/2025    Mucocele of lip        Past Surgical History:   Procedure Laterality Date    VASECTOMY N/A 01/20/2024        reports that he has been smoking cigarettes. He has a 15 pack-year smoking history. He has never used smokeless tobacco. He reports current alcohol use of about 30.0 standard drinks of alcohol per week. He reports that he does not use drugs.    Review of Systems  Review of Systems                               Objective    Vitals:    02/24/25 0937   BP: 135/90   BP Location: Left arm   Patient Position: Sitting   BP Cuff Size: Adult   Pulse: 80   Resp: 20   Temp: 36.7 °C (98.1 °F)   TempSrc: Oral   SpO2: 99%   Weight: 93 kg (205 lb 0.4 oz)   Height: 1.753 m (5' 9\")     No LMP for male patient.    Physical Exam  Constitutional:       Appearance: Normal appearance.   HENT:      Head: Normocephalic and atraumatic.      Right Ear: Tympanic membrane normal.      Left Ear: Tympanic membrane normal.      Nose: Congestion and rhinorrhea present.      Mouth/Throat:      Mouth: Mucous membranes are moist.      Pharynx: " Posterior oropharyngeal erythema (cobbling) present. No oropharyngeal exudate.   Eyes:      Extraocular Movements: Extraocular movements intact.      Conjunctiva/sclera: Conjunctivae normal.      Pupils: Pupils are equal, round, and reactive to light.   Cardiovascular:      Rate and Rhythm: Normal rate and regular rhythm.      Heart sounds: No murmur heard.  Pulmonary:      Effort: Pulmonary effort is normal. No respiratory distress.      Breath sounds: No wheezing or rhonchi.      Comments: Mildly diminished breath sounds  Musculoskeletal:         General: Normal range of motion.      Cervical back: Normal range of motion and neck supple.   Lymphadenopathy:      Cervical: No cervical adenopathy.   Skin:     General: Skin is warm and dry.      Capillary Refill: Capillary refill takes 2 to 3 seconds.   Neurological:      General: No focal deficit present.      Mental Status: He is alert.         Procedures    Point of Care Test & Imaging Results from this visit  No results found for this visit on 02/24/25.   No results found.    Diagnostic study results (if any) were reviewed by Sheree Dupree PA-C.    Assessment/Plan   Allergies, medications, history, and pertinent labs/EKGs/Imaging reviewed by Sheree Dupree PA-C.     Medical Decision Making   44 y.o. male present today with a chief complaint of Nasal Congestion (Nasal congestion for past 4 weeks that is not clearing up ).  Reviewed vitals stable. Exam remarkable for nasal congestion, rhinorrhea, pharyngeal erythema without exudate, mildly diminished breath sounds without respiratory distress    Acute sinusitis- Start Augmentin twice a day x 10 days; take with food and probiotic to decrease upset stomach.   Recommend warm salt water gargles, saline nasal spray every 2 hours as needed congestion, Humidifier, Vicks Chest RUB, OTC expectorant such as Robitussin or Mucinex  with plenty of fluids, Hydration with Pedialyte or water, Trial of Flonase nasal  spray 2 sprays each nostril daily or 1 spray twice a day x 3-7 days and zyrtec 10mg daily x 1 week  Go to ER if chest pain or difficulty breathing or dizziness or fever 103 with headache or neck stiffness.  Orders and Diagnoses  Diagnoses and all orders for this visit:  Acute non-recurrent maxillary sinusitis  -     amoxicillin-pot clavulanate (Augmentin) 875-125 mg tablet; Take 1 tablet (875 mg) by mouth 2 times a day.      Medical Admin Record      Patient disposition: Home    Electronically signed by Sheree Dupree PA-C  10:22 AM

## 2025-06-17 DIAGNOSIS — R10.10 UPPER ABDOMINAL PAIN: ICD-10-CM

## 2025-06-17 RX ORDER — PANTOPRAZOLE SODIUM 20 MG/1
20 TABLET, DELAYED RELEASE ORAL DAILY
Qty: 90 TABLET | Refills: 0 | Status: SHIPPED | OUTPATIENT
Start: 2025-06-17 | End: 2025-09-15

## 2025-07-10 ENCOUNTER — TELEPHONE (OUTPATIENT)
Dept: PRIMARY CARE | Facility: CLINIC | Age: 45
End: 2025-07-10
Payer: COMMERCIAL

## 2025-07-10 NOTE — TELEPHONE ENCOUNTER
/Patient has been gassy, bloating, with constipation for the last 2 days and he was not sure how long he should wait until he needs to possibly make an appt- he has been using mirafast gummies and used an enema yesterday, also fiber pills x 9 a day.  He wanted to know how long he should wait until his bowels return to normal.   Last OV 9/13/24

## 2025-08-01 ENCOUNTER — OFFICE VISIT (OUTPATIENT)
Dept: URGENT CARE | Facility: URGENT CARE | Age: 45
End: 2025-08-01
Payer: COMMERCIAL

## 2025-08-01 VITALS
SYSTOLIC BLOOD PRESSURE: 126 MMHG | RESPIRATION RATE: 20 BRPM | OXYGEN SATURATION: 99 % | TEMPERATURE: 99.6 F | DIASTOLIC BLOOD PRESSURE: 89 MMHG | HEART RATE: 86 BPM

## 2025-08-01 DIAGNOSIS — K59.09 OTHER CONSTIPATION: ICD-10-CM

## 2025-08-01 DIAGNOSIS — K64.5 PERIANAL VENOUS THROMBOSIS: Primary | ICD-10-CM

## 2025-08-01 PROCEDURE — 99214 OFFICE O/P EST MOD 30 MIN: CPT | Performed by: PHYSICIAN ASSISTANT

## 2025-08-01 RX ORDER — LIDOCAINE HYDROCHLORIDE AND HYDROCORTISONE ACETATE 30; 5 MG/G; MG/G
CREAM RECTAL
Qty: 7 G | Refills: 0 | Status: ON HOLD | OUTPATIENT
Start: 2025-08-01

## 2025-08-01 RX ORDER — ADHESIVE BANDAGE
30 BANDAGE TOPICAL DAILY PRN
Qty: 360 ML | Refills: 0 | Status: ON HOLD | OUTPATIENT
Start: 2025-08-01 | End: 2025-08-11

## 2025-08-01 RX ORDER — DOCUSATE SODIUM 100 MG/1
100 CAPSULE, LIQUID FILLED ORAL 2 TIMES DAILY
Qty: 60 CAPSULE | Refills: 0 | Status: ON HOLD | OUTPATIENT
Start: 2025-08-01 | End: 2025-08-31

## 2025-08-01 ASSESSMENT — PAIN SCALES - GENERAL: PAINLEVEL_OUTOF10: 5

## 2025-08-01 NOTE — PROGRESS NOTES
"Subjective   Patient ID: Maciej Felipe is a 45 y.o. male present today with a chief complaint of Other (\"Swelling between rectum and tailbone,\" patient does have an appt next week, but sxs are worsening. Difficulty sitting and standing. Pt is having hard stools, taking fiber to help, but still having issues. Sxs started last week.).    History of Present Illness  HPI  Pt had appt with GI on June 2nd had hemorrhoid banding. He has appt with GI on Tuesday next week. He has no blood in stool. Last stool this morning, hard and straining. No abdominal pain. He was feverish last night. No urinary symptoms. Pt tried otc hemorrhoid cream, sitz bath and fiber supplement 3 times a day with lots of water. He is taking tylenol for pain.  Past Medical History  Allergies as of 08/01/2025 - Reviewed 08/01/2025   Allergen Reaction Noted    Shellfish derived Cough, Dizziness, Hives, Itching, Rash, Shortness of breath, and Swelling 01/04/2024       Prescriptions Prior to Admission[1]     Medical History[2]    Surgical History[3]     reports that he has been smoking cigarettes. He has a 15 pack-year smoking history. He has never used smokeless tobacco. He reports current alcohol use of about 30.0 standard drinks of alcohol per week. He reports that he does not use drugs.    Review of Systems  Review of Systems                               Objective    Vitals:    08/01/25 0855   BP: 126/89   Pulse: 86   Resp: 20   Temp: 37.6 °C (99.6 °F)   TempSrc: Oral   SpO2: 99%     No LMP for male patient.    Physical Exam  Constitutional:       Appearance: Normal appearance.     Cardiovascular:      Rate and Rhythm: Normal rate and regular rhythm.      Heart sounds: No murmur heard.  Pulmonary:      Effort: Pulmonary effort is normal.      Breath sounds: Normal breath sounds.   Abdominal:      General: Abdomen is flat. There is no distension.      Palpations: Abdomen is soft. There is no mass.      Tenderness: There is no abdominal tenderness. " "There is no right CVA tenderness, left CVA tenderness or guarding.   Genitourinary:     Comments:  firm swelling to 11 oclock otherwise rectum normal, consistent with hemorrhoid, mild tenderness to palpation.     Musculoskeletal:         General: Normal range of motion.     Skin:     General: Skin is warm.      Findings: No rash.     Neurological:      General: No focal deficit present.      Mental Status: He is alert.         Procedures    Point of Care Test & Imaging Results from this visit  No results found for this visit on 08/01/25.   Imaging  No results found.    Cardiology, Vascular, and Other Imaging  No other imaging results found for the past 2 days      Diagnostic study results (if any) were reviewed by Sheree Dupree PA-C.    Assessment/Plan   Allergies, medications, history, and pertinent labs/EKGs/Imaging reviewed by Sheree Dupree PA-C.     Medical Decision Making   45 y.o. male with constipation present today with a chief complaint of Other (\"Swelling between rectum and tailbone,\" patient does have an appt next week, but sxs are worsening. Difficulty sitting and standing. Pt is having hard stools, taking fiber to help, but still having issues. Sxs started last week.).  Reviewed vitals stable. Exam remarkable for nontoxic, normal heart and lung exam, no abd tenderness,  firm swelling to 11 oclock otherwise rectum normal, consistent with hemorrhoid, mild tenderness to palpation.     Discussed with supervising physician Dr Scott who recommended milk of mag, hydrocortisone-lidocaine cream for pain, no ER at this time.  Discussed with pt Constipation with external hemorrhoid- advised to continue epson salt sitz bath 3 times a day, start hydrocortisone-lidocaine cream to rectum 3 times a day as needed for pain, start colace 100mg twice a day for constipation. Start milk of magnesia 30ml once or twice a day as needed to produce soft loose stools. Continue 30 g of fiber per day with 2-4 " L/day of water.  GO to ER if severe pain, rectal bleeding or fever 103 with abdominal pain.   Orders and Diagnoses  Diagnoses and all orders for this visit:  Perianal venous thrombosis  -     lidocaine HCl-hydrocortison ac 3-0.5 % cream; Apply topically 3 times a day x 3-5 days  Other constipation  -     lidocaine HCl-hydrocortison ac 3-0.5 % cream; Apply topically 3 times a day x 3-5 days  -     magnesium hydroxide (Milk of Magnesia) 400 mg/5 mL suspension; Take 30 mL by mouth once daily as needed for constipation for up to 10 days.  -     docusate sodium (Colace) 100 mg capsule; Take 1 capsule (100 mg) by mouth 2 times a day.      Medical Admin Record      Patient disposition: Home    Electronically signed by Sheree Dupree PA-C  9:33 AM    ADDENDUM 8/1/25 1:26PM Pt called stating lidocaine-hydrocortisone cream is not available and needs prior auth. I contacted [facility] pharmacist; switched to hydrocortisone cream 1% rectal application 2 times a day x 3-5 days  total 28g tube and lidocaine 4% cream twice a day 15 g tube. Notified pt of the changes.       [1] (Not in a hospital admission)   [2]   Past Medical History:  Diagnosis Date    Lung nodules 08/2024    RML-ketty 1 yr chest ct w/o contrast 8/2025    Mucocele of lip    [3]   Past Surgical History:  Procedure Laterality Date    VASECTOMY N/A 01/20/2024

## 2025-08-01 NOTE — PATIENT INSTRUCTIONS
Constipation with external hemorrhoid- advised to continue epson salt sitz bath 3 times a day, start hydrocortisone-lidocaine cream to rectum 3 times a day as needed for pain, start colace 100mg twice a day for constipation. Start milk of magnesia 30ml once or twice a day as needed to produce soft loose stools. Continue 30 g of fiber per day with 2-4 L/day of water.  GO to ER if severe pain, rectal bleeding or fever 103 with abdominal pain.

## 2025-08-03 ENCOUNTER — HOSPITAL ENCOUNTER (INPATIENT)
Facility: HOSPITAL | Age: 45
LOS: 1 days | Discharge: HOME | DRG: 395 | End: 2025-08-05
Attending: INTERNAL MEDICINE | Admitting: INTERNAL MEDICINE
Payer: COMMERCIAL

## 2025-08-03 ENCOUNTER — APPOINTMENT (OUTPATIENT)
Dept: RADIOLOGY | Facility: HOSPITAL | Age: 45
DRG: 395 | End: 2025-08-03
Payer: COMMERCIAL

## 2025-08-03 DIAGNOSIS — K62.89 RECTAL PAIN: ICD-10-CM

## 2025-08-03 DIAGNOSIS — D72.829 LEUKOCYTOSIS, UNSPECIFIED TYPE: ICD-10-CM

## 2025-08-03 DIAGNOSIS — K61.0 PERIANAL ABSCESS: Primary | ICD-10-CM

## 2025-08-03 DIAGNOSIS — K61.1 PERI-RECTAL ABSCESS: ICD-10-CM

## 2025-08-03 DIAGNOSIS — R10.84 GENERALIZED ABDOMINAL PAIN: ICD-10-CM

## 2025-08-03 DIAGNOSIS — K59.00 CONSTIPATION, UNSPECIFIED CONSTIPATION TYPE: ICD-10-CM

## 2025-08-03 LAB
ALBUMIN SERPL BCP-MCNC: 4.1 G/DL (ref 3.4–5)
ALP SERPL-CCNC: 56 U/L (ref 33–120)
ALT SERPL W P-5'-P-CCNC: 29 U/L (ref 10–52)
ANION GAP SERPL CALC-SCNC: 14 MMOL/L (ref 10–20)
AST SERPL W P-5'-P-CCNC: 20 U/L (ref 9–39)
BASOPHILS # BLD AUTO: 0.02 X10*3/UL (ref 0–0.1)
BASOPHILS NFR BLD AUTO: 0.2 %
BILIRUB SERPL-MCNC: 1 MG/DL (ref 0–1.2)
BUN SERPL-MCNC: 6 MG/DL (ref 6–23)
CALCIUM SERPL-MCNC: 8.7 MG/DL (ref 8.6–10.3)
CHLORIDE SERPL-SCNC: 96 MMOL/L (ref 98–107)
CO2 SERPL-SCNC: 24 MMOL/L (ref 21–32)
CREAT SERPL-MCNC: 0.68 MG/DL (ref 0.5–1.3)
EGFRCR SERPLBLD CKD-EPI 2021: >90 ML/MIN/1.73M*2
EOSINOPHIL # BLD AUTO: 0.02 X10*3/UL (ref 0–0.7)
EOSINOPHIL NFR BLD AUTO: 0.2 %
ERYTHROCYTE [DISTWIDTH] IN BLOOD BY AUTOMATED COUNT: 12.7 % (ref 11.5–14.5)
GLUCOSE SERPL-MCNC: 98 MG/DL (ref 74–99)
HCT VFR BLD AUTO: 41.8 % (ref 41–52)
HGB BLD-MCNC: 14.3 G/DL (ref 13.5–17.5)
IMM GRANULOCYTES # BLD AUTO: 0.04 X10*3/UL (ref 0–0.7)
IMM GRANULOCYTES NFR BLD AUTO: 0.3 % (ref 0–0.9)
LACTATE SERPL-SCNC: 0.7 MMOL/L (ref 0.4–2)
LYMPHOCYTES # BLD AUTO: 1.01 X10*3/UL (ref 1.2–4.8)
LYMPHOCYTES NFR BLD AUTO: 8.3 %
MCH RBC QN AUTO: 30 PG (ref 26–34)
MCHC RBC AUTO-ENTMCNC: 34.2 G/DL (ref 32–36)
MCV RBC AUTO: 88 FL (ref 80–100)
MONOCYTES # BLD AUTO: 0.96 X10*3/UL (ref 0.1–1)
MONOCYTES NFR BLD AUTO: 7.9 %
NEUTROPHILS # BLD AUTO: 10.08 X10*3/UL (ref 1.2–7.7)
NEUTROPHILS NFR BLD AUTO: 83.1 %
NRBC BLD-RTO: 0 /100 WBCS (ref 0–0)
PLATELET # BLD AUTO: 301 X10*3/UL (ref 150–450)
POTASSIUM SERPL-SCNC: 4 MMOL/L (ref 3.5–5.3)
PROT SERPL-MCNC: 6.8 G/DL (ref 6.4–8.2)
RBC # BLD AUTO: 4.76 X10*6/UL (ref 4.5–5.9)
SODIUM SERPL-SCNC: 130 MMOL/L (ref 136–145)
WBC # BLD AUTO: 12.1 X10*3/UL (ref 4.4–11.3)

## 2025-08-03 PROCEDURE — 2500000001 HC RX 250 WO HCPCS SELF ADMINISTERED DRUGS (ALT 637 FOR MEDICARE OP): Performed by: NURSE PRACTITIONER

## 2025-08-03 PROCEDURE — 74177 CT ABD & PELVIS W/CONTRAST: CPT | Performed by: RADIOLOGY

## 2025-08-03 PROCEDURE — 36415 COLL VENOUS BLD VENIPUNCTURE: CPT | Performed by: PHYSICIAN ASSISTANT

## 2025-08-03 PROCEDURE — 74177 CT ABD & PELVIS W/CONTRAST: CPT

## 2025-08-03 PROCEDURE — 2500000004 HC RX 250 GENERAL PHARMACY W/ HCPCS (ALT 636 FOR OP/ED)

## 2025-08-03 PROCEDURE — G0378 HOSPITAL OBSERVATION PER HR: HCPCS

## 2025-08-03 PROCEDURE — 99285 EMERGENCY DEPT VISIT HI MDM: CPT | Mod: 25

## 2025-08-03 PROCEDURE — 2550000001 HC RX 255 CONTRASTS: Performed by: PHYSICIAN ASSISTANT

## 2025-08-03 PROCEDURE — 94760 N-INVAS EAR/PLS OXIMETRY 1: CPT

## 2025-08-03 PROCEDURE — 2500000004 HC RX 250 GENERAL PHARMACY W/ HCPCS (ALT 636 FOR OP/ED): Mod: JZ | Performed by: SURGERY

## 2025-08-03 PROCEDURE — 2500000002 HC RX 250 W HCPCS SELF ADMINISTERED DRUGS (ALT 637 FOR MEDICARE OP, ALT 636 FOR OP/ED): Performed by: NURSE PRACTITIONER

## 2025-08-03 PROCEDURE — S4991 NICOTINE PATCH NONLEGEND: HCPCS | Performed by: NURSE PRACTITIONER

## 2025-08-03 PROCEDURE — 80053 COMPREHEN METABOLIC PANEL: CPT | Performed by: PHYSICIAN ASSISTANT

## 2025-08-03 PROCEDURE — 83605 ASSAY OF LACTIC ACID: CPT | Performed by: PHYSICIAN ASSISTANT

## 2025-08-03 PROCEDURE — 85025 COMPLETE CBC W/AUTO DIFF WBC: CPT | Performed by: PHYSICIAN ASSISTANT

## 2025-08-03 PROCEDURE — 2500000004 HC RX 250 GENERAL PHARMACY W/ HCPCS (ALT 636 FOR OP/ED): Performed by: PHYSICIAN ASSISTANT

## 2025-08-03 RX ORDER — VITAMIN B COMPLEX
1 CAPSULE ORAL DAILY
COMMUNITY

## 2025-08-03 RX ORDER — BISMUTH SUBSALICYLATE 262 MG
1 TABLET,CHEWABLE ORAL DAILY
Status: DISCONTINUED | OUTPATIENT
Start: 2025-08-03 | End: 2025-08-05 | Stop reason: HOSPADM

## 2025-08-03 RX ORDER — SODIUM CHLORIDE 9 MG/ML
INJECTION, SOLUTION INTRAVENOUS
Status: COMPLETED
Start: 2025-08-03 | End: 2025-08-03

## 2025-08-03 RX ORDER — MORPHINE SULFATE 4 MG/ML
4 INJECTION, SOLUTION INTRAMUSCULAR; INTRAVENOUS ONCE
Status: COMPLETED | OUTPATIENT
Start: 2025-08-03 | End: 2025-08-03

## 2025-08-03 RX ORDER — IBUPROFEN 200 MG
1 TABLET ORAL DAILY
Status: DISCONTINUED | OUTPATIENT
Start: 2025-08-03 | End: 2025-08-05 | Stop reason: HOSPADM

## 2025-08-03 RX ORDER — DIAZEPAM 5 MG/1
10 TABLET ORAL EVERY 2 HOUR PRN
Status: DISCONTINUED | OUTPATIENT
Start: 2025-08-03 | End: 2025-08-05 | Stop reason: HOSPADM

## 2025-08-03 RX ORDER — ACETAMINOPHEN 500 MG
5 TABLET ORAL NIGHTLY PRN
Status: DISCONTINUED | OUTPATIENT
Start: 2025-08-03 | End: 2025-08-05 | Stop reason: HOSPADM

## 2025-08-03 RX ORDER — ONDANSETRON HYDROCHLORIDE 2 MG/ML
4 INJECTION, SOLUTION INTRAVENOUS ONCE
Status: COMPLETED | OUTPATIENT
Start: 2025-08-03 | End: 2025-08-03

## 2025-08-03 RX ORDER — AMPICILLIN AND SULBACTAM 2; 1 G/1; G/1
INJECTION, POWDER, FOR SOLUTION INTRAMUSCULAR; INTRAVENOUS
Status: COMPLETED
Start: 2025-08-03 | End: 2025-08-03

## 2025-08-03 RX ORDER — ACETAMINOPHEN 325 MG/1
650 TABLET ORAL EVERY 4 HOURS PRN
Status: DISCONTINUED | OUTPATIENT
Start: 2025-08-03 | End: 2025-08-05 | Stop reason: HOSPADM

## 2025-08-03 RX ORDER — CALCIUM CARBONATE 200(500)MG
500 TABLET,CHEWABLE ORAL 4 TIMES DAILY PRN
Status: DISCONTINUED | OUTPATIENT
Start: 2025-08-03 | End: 2025-08-05 | Stop reason: HOSPADM

## 2025-08-03 RX ORDER — PANTOPRAZOLE SODIUM 40 MG/1
40 TABLET, DELAYED RELEASE ORAL
Status: DISCONTINUED | OUTPATIENT
Start: 2025-08-04 | End: 2025-08-05 | Stop reason: HOSPADM

## 2025-08-03 RX ORDER — PANTOPRAZOLE SODIUM 40 MG/10ML
40 INJECTION, POWDER, LYOPHILIZED, FOR SOLUTION INTRAVENOUS
Status: DISCONTINUED | OUTPATIENT
Start: 2025-08-04 | End: 2025-08-05 | Stop reason: HOSPADM

## 2025-08-03 RX ORDER — FOLIC ACID 1 MG/1
1 TABLET ORAL DAILY
Status: DISCONTINUED | OUTPATIENT
Start: 2025-08-03 | End: 2025-08-05 | Stop reason: HOSPADM

## 2025-08-03 RX ORDER — AMOXICILLIN 250 MG
1 CAPSULE ORAL NIGHTLY PRN
Status: DISCONTINUED | OUTPATIENT
Start: 2025-08-03 | End: 2025-08-05 | Stop reason: HOSPADM

## 2025-08-03 RX ORDER — ONDANSETRON HYDROCHLORIDE 2 MG/ML
4 INJECTION, SOLUTION INTRAVENOUS EVERY 8 HOURS PRN
Status: DISCONTINUED | OUTPATIENT
Start: 2025-08-03 | End: 2025-08-05 | Stop reason: HOSPADM

## 2025-08-03 RX ORDER — OXYCODONE HYDROCHLORIDE 5 MG/1
5 TABLET ORAL EVERY 4 HOURS PRN
Status: DISCONTINUED | OUTPATIENT
Start: 2025-08-03 | End: 2025-08-05

## 2025-08-03 RX ORDER — ONDANSETRON 4 MG/1
4 TABLET, FILM COATED ORAL EVERY 8 HOURS PRN
Status: DISCONTINUED | OUTPATIENT
Start: 2025-08-03 | End: 2025-08-05 | Stop reason: HOSPADM

## 2025-08-03 RX ORDER — KETOROLAC TROMETHAMINE 30 MG/ML
30 INJECTION, SOLUTION INTRAMUSCULAR; INTRAVENOUS EVERY 6 HOURS PRN
Status: DISCONTINUED | OUTPATIENT
Start: 2025-08-03 | End: 2025-08-05 | Stop reason: HOSPADM

## 2025-08-03 RX ORDER — LANOLIN ALCOHOL/MO/W.PET/CERES
100 CREAM (GRAM) TOPICAL DAILY
Status: DISCONTINUED | OUTPATIENT
Start: 2025-08-03 | End: 2025-08-05 | Stop reason: HOSPADM

## 2025-08-03 RX ADMIN — AMPICILLIN AND SULBACTAM 3 G: 2; 1 INJECTION, POWDER, FOR SOLUTION INTRAMUSCULAR; INTRAVENOUS at 15:15

## 2025-08-03 RX ADMIN — IOHEXOL 75 ML: 350 INJECTION, SOLUTION INTRAVENOUS at 13:53

## 2025-08-03 RX ADMIN — ONDANSETRON 4 MG: 2 INJECTION INTRAMUSCULAR; INTRAVENOUS at 14:04

## 2025-08-03 RX ADMIN — FOLIC ACID 1 MG: 1 TABLET ORAL at 17:06

## 2025-08-03 RX ADMIN — MORPHINE SULFATE 4 MG: 4 INJECTION, SOLUTION INTRAMUSCULAR; INTRAVENOUS at 14:07

## 2025-08-03 RX ADMIN — NICOTINE 1 PATCH: 14 PATCH, EXTENDED RELEASE TRANSDERMAL at 17:06

## 2025-08-03 RX ADMIN — KETOROLAC TROMETHAMINE 30 MG: 30 INJECTION, SOLUTION INTRAMUSCULAR at 18:06

## 2025-08-03 RX ADMIN — SODIUM CHLORIDE 1000 ML: 0.9 INJECTION, SOLUTION INTRAVENOUS at 13:47

## 2025-08-03 RX ADMIN — THIAMINE HCL TAB 100 MG 100 MG: 100 TAB at 17:06

## 2025-08-03 RX ADMIN — AMPICILLIN SODIUM AND SULBACTAM SODIUM 3 G: 2; 1 INJECTION, POWDER, FOR SOLUTION INTRAMUSCULAR; INTRAVENOUS at 15:13

## 2025-08-03 RX ADMIN — AMPICILLIN AND SULBACTAM 3 G: 2; 1 INJECTION, POWDER, FOR SOLUTION INTRAMUSCULAR; INTRAVENOUS at 21:55

## 2025-08-03 RX ADMIN — THERA TABS 1 TABLET: TAB at 17:06

## 2025-08-03 SDOH — ECONOMIC STABILITY: FOOD INSECURITY: WITHIN THE PAST 12 MONTHS, THE FOOD YOU BOUGHT JUST DIDN'T LAST AND YOU DIDN'T HAVE MONEY TO GET MORE.: NEVER TRUE

## 2025-08-03 SDOH — SOCIAL STABILITY: SOCIAL INSECURITY
WITHIN THE LAST YEAR, HAVE YOU BEEN RAPED OR FORCED TO HAVE ANY KIND OF SEXUAL ACTIVITY BY YOUR PARTNER OR EX-PARTNER?: NO

## 2025-08-03 SDOH — SOCIAL STABILITY: SOCIAL INSECURITY: ARE THERE ANY APPARENT SIGNS OF INJURIES/BEHAVIORS THAT COULD BE RELATED TO ABUSE/NEGLECT?: NO

## 2025-08-03 SDOH — SOCIAL STABILITY: SOCIAL INSECURITY: WITHIN THE LAST YEAR, HAVE YOU BEEN AFRAID OF YOUR PARTNER OR EX-PARTNER?: NO

## 2025-08-03 SDOH — SOCIAL STABILITY: SOCIAL INSECURITY: WITHIN THE LAST YEAR, HAVE YOU BEEN HUMILIATED OR EMOTIONALLY ABUSED IN OTHER WAYS BY YOUR PARTNER OR EX-PARTNER?: NO

## 2025-08-03 SDOH — SOCIAL STABILITY: SOCIAL INSECURITY: ARE YOU OR HAVE YOU BEEN THREATENED OR ABUSED PHYSICALLY, EMOTIONALLY, OR SEXUALLY BY ANYONE?: NO

## 2025-08-03 SDOH — ECONOMIC STABILITY: FOOD INSECURITY: WITHIN THE PAST 12 MONTHS, YOU WORRIED THAT YOUR FOOD WOULD RUN OUT BEFORE YOU GOT THE MONEY TO BUY MORE.: NEVER TRUE

## 2025-08-03 SDOH — SOCIAL STABILITY: SOCIAL INSECURITY
WITHIN THE LAST YEAR, HAVE YOU BEEN KICKED, HIT, SLAPPED, OR OTHERWISE PHYSICALLY HURT BY YOUR PARTNER OR EX-PARTNER?: NO

## 2025-08-03 SDOH — SOCIAL STABILITY: SOCIAL INSECURITY: HAS ANYONE EVER THREATENED TO HURT YOUR FAMILY OR YOUR PETS?: NO

## 2025-08-03 SDOH — ECONOMIC STABILITY: INCOME INSECURITY: IN THE PAST 12 MONTHS HAS THE ELECTRIC, GAS, OIL, OR WATER COMPANY THREATENED TO SHUT OFF SERVICES IN YOUR HOME?: NO

## 2025-08-03 SDOH — SOCIAL STABILITY: SOCIAL INSECURITY: ABUSE: ADULT

## 2025-08-03 SDOH — SOCIAL STABILITY: SOCIAL INSECURITY: DO YOU FEEL UNSAFE GOING BACK TO THE PLACE WHERE YOU ARE LIVING?: NO

## 2025-08-03 SDOH — SOCIAL STABILITY: SOCIAL INSECURITY: DO YOU FEEL ANYONE HAS EXPLOITED OR TAKEN ADVANTAGE OF YOU FINANCIALLY OR OF YOUR PERSONAL PROPERTY?: NO

## 2025-08-03 SDOH — SOCIAL STABILITY: SOCIAL INSECURITY: DOES ANYONE TRY TO KEEP YOU FROM HAVING/CONTACTING OTHER FRIENDS OR DOING THINGS OUTSIDE YOUR HOME?: NO

## 2025-08-03 SDOH — SOCIAL STABILITY: SOCIAL INSECURITY: WERE YOU ABLE TO COMPLETE ALL THE BEHAVIORAL HEALTH SCREENINGS?: YES

## 2025-08-03 SDOH — SOCIAL STABILITY: SOCIAL INSECURITY: HAVE YOU HAD THOUGHTS OF HARMING ANYONE ELSE?: NO

## 2025-08-03 SDOH — SOCIAL STABILITY: SOCIAL INSECURITY: HAVE YOU HAD ANY THOUGHTS OF HARMING ANYONE ELSE?: NO

## 2025-08-03 ASSESSMENT — ACTIVITIES OF DAILY LIVING (ADL)
PATIENT'S MEMORY ADEQUATE TO SAFELY COMPLETE DAILY ACTIVITIES?: YES
GROOMING: INDEPENDENT
HEARING - LEFT EAR: FUNCTIONAL
ASSISTIVE_DEVICE: EYEGLASSES
WALKS IN HOME: INDEPENDENT
FEEDING YOURSELF: INDEPENDENT
ADEQUATE_TO_COMPLETE_ADL: YES
TOILETING: INDEPENDENT
LACK_OF_TRANSPORTATION: NO
HEARING - RIGHT EAR: FUNCTIONAL
BATHING: INDEPENDENT
JUDGMENT_ADEQUATE_SAFELY_COMPLETE_DAILY_ACTIVITIES: YES
DRESSING YOURSELF: INDEPENDENT

## 2025-08-03 ASSESSMENT — COGNITIVE AND FUNCTIONAL STATUS - GENERAL
CLIMB 3 TO 5 STEPS WITH RAILING: A LITTLE
TOILETING: A LITTLE
MOBILITY SCORE: 22
DAILY ACTIVITIY SCORE: 23
MOBILITY SCORE: 22
WALKING IN HOSPITAL ROOM: A LITTLE
CLIMB 3 TO 5 STEPS WITH RAILING: A LITTLE
PATIENT BASELINE BEDBOUND: NO
TOILETING: A LITTLE
DAILY ACTIVITIY SCORE: 23
WALKING IN HOSPITAL ROOM: A LITTLE

## 2025-08-03 ASSESSMENT — LIFESTYLE VARIABLES
ORIENTATION AND CLOUDING OF SENSORIUM: ORIENTED AND CAN DO SERIAL ADDITIONS
ORIENTATION AND CLOUDING OF SENSORIUM: ORIENTED AND CAN DO SERIAL ADDITIONS
TREMOR: NO TREMOR
AUDIT TOTAL SCORE: 0
HEADACHE, FULLNESS IN HEAD: NOT PRESENT
ANXIETY: NO ANXIETY, AT EASE
HOW OFTEN DO YOU HAVE A DRINK CONTAINING ALCOHOL: 2-3 TIMES A WEEK
TREMOR: NO TREMOR
NAUSEA AND VOMITING: NO NAUSEA AND NO VOMITING
ORIENTATION AND CLOUDING OF SENSORIUM: ORIENTED AND CAN DO SERIAL ADDITIONS
AGITATION: NORMAL ACTIVITY
HOW OFTEN DO YOU HAVE 6 OR MORE DRINKS ON ONE OCCASION: WEEKLY
AUDIT TOTAL SCORE: 6
HOW MANY STANDARD DRINKS CONTAINING ALCOHOL DO YOU HAVE ON A TYPICAL DAY: 1 OR 2
AGITATION: NORMAL ACTIVITY
AGITATION: NORMAL ACTIVITY
TOTAL SCORE: 0
AUDIT-C TOTAL SCORE: 6
VISUAL DISTURBANCES: NOT PRESENT
PAROXYSMAL SWEATS: NO SWEAT VISIBLE
HOW OFTEN DURING THE LAST YEAR HAVE YOU FAILED TO DO WHAT WAS NORMALLY EXPECTED FROM YOU BECAUSE OF DRINKING: NEVER
VISUAL DISTURBANCES: NOT PRESENT
HAVE YOU OR SOMEONE ELSE BEEN INJURED AS A RESULT OF YOUR DRINKING: NO
AUDITORY DISTURBANCES: NOT PRESENT
PAROXYSMAL SWEATS: NO SWEAT VISIBLE
HEADACHE, FULLNESS IN HEAD: NOT PRESENT
HOW OFTEN DURING THE LAST YEAR HAVE YOU BEEN UNABLE TO REMEMBER WHAT HAPPENED THE NIGHT BEFORE BECAUSE YOU HAD BEEN DRINKING: NEVER
TREMOR: NO TREMOR
SUBSTANCE_ABUSE_PAST_12_MONTHS: NO
HEADACHE, FULLNESS IN HEAD: NOT PRESENT
ANXIETY: NO ANXIETY, AT EASE
TOTAL SCORE: 0
AUDITORY DISTURBANCES: NOT PRESENT
HOW OFTEN DURING THE LAST YEAR HAVE YOU FOUND THAT YOU WERE NOT ABLE TO STOP DRINKING ONCE YOU HAD STARTED: NEVER
ANXIETY: NO ANXIETY, AT EASE
NAUSEA AND VOMITING: NO NAUSEA AND NO VOMITING
NAUSEA AND VOMITING: NO NAUSEA AND NO VOMITING
PRESCIPTION_ABUSE_PAST_12_MONTHS: NO
HAS A RELATIVE, FRIEND, DOCTOR, OR ANOTHER HEALTH PROFESSIONAL EXPRESSED CONCERN ABOUT YOUR DRINKING OR SUGGESTED YOU CUT DOWN: NO
AUDIT-C TOTAL SCORE: 6
TOTAL SCORE: 0
HOW OFTEN DURING THE LAST YEAR HAVE YOU HAD A FEELING OF GUILT OR REMORSE AFTER DRINKING: NEVER
PAROXYSMAL SWEATS: NO SWEAT VISIBLE
HOW OFTEN DURING THE LAST YEAR HAVE YOU NEEDED AN ALCOHOLIC DRINK FIRST THING IN THE MORNING TO GET YOURSELF GOING AFTER A NIGHT OF HEAVY DRINKING: NEVER
SKIP TO QUESTIONS 9-10: 0
AUDITORY DISTURBANCES: NOT PRESENT
VISUAL DISTURBANCES: NOT PRESENT

## 2025-08-03 ASSESSMENT — PAIN SCALES - GENERAL
PAINLEVEL_OUTOF10: 1
PAINLEVEL_OUTOF10: 3
PAINLEVEL_OUTOF10: 1
PAINLEVEL_OUTOF10: 0 - NO PAIN

## 2025-08-03 ASSESSMENT — PAIN - FUNCTIONAL ASSESSMENT
PAIN_FUNCTIONAL_ASSESSMENT: 0-10
PAIN_FUNCTIONAL_ASSESSMENT: CPOT (CRITICAL CARE PAIN OBSERVATION TOOL)

## 2025-08-03 ASSESSMENT — PAIN DESCRIPTION - PAIN TYPE: TYPE: ACUTE PAIN

## 2025-08-03 ASSESSMENT — PAIN DESCRIPTION - DESCRIPTORS: DESCRIPTORS: DISCOMFORT

## 2025-08-03 ASSESSMENT — PATIENT HEALTH QUESTIONNAIRE - PHQ9
SUM OF ALL RESPONSES TO PHQ9 QUESTIONS 1 & 2: 0
1. LITTLE INTEREST OR PLEASURE IN DOING THINGS: NOT AT ALL
2. FEELING DOWN, DEPRESSED OR HOPELESS: NOT AT ALL

## 2025-08-03 ASSESSMENT — PAIN DESCRIPTION - LOCATION: LOCATION: RECTUM

## 2025-08-03 NOTE — LETTER
August 5, 2025     Patient: Maciej Felipe   YOB: 1980   Date of Visit: 8/3/2025       To Whom It May Concern:    Maciej Felipe was admitted under my care from 8/4/2025 through 8/5/2025. He is able to return to work on 8/11/2025. If you have any questions or concerns, please don't hesitate to call.       Sincerely,     TANVIR Covarrubias'  08/05/25  9:17 AM

## 2025-08-03 NOTE — ED PROVIDER NOTES
HPI   Chief Complaint   Patient presents with    Abdominal Pain     Pt states having abdominal pain, went to urgent care and was diagnosed with the hemorrhoid. Pt also reports fevers and no BM for 2 days    Hemorrhoids    Constipation       Is a 45-year-old male coming in for abdominal discomfort as well as a potential external hemorrhoid.  Patient was seen at the urgent care and was diagnosed with external hemorrhoid the other day.  He states that he has not had a bowel movement in 2 days due to severe pain to the rectum.  Patient has had and subjective fever at home.  He states he has had constipation and a history of GI issues.  He has had a colonoscopy before in the past.  Patient has an appointment on Tuesday with a GI doctor however this was canceled as they stated they could not do anything for hemorrhoids.  He states he has not been drinking as much as per usual.  Urgent care placed him on lidocaine, milk of magnesia and docusate.      History provided by:  Patient          Patient History   Medical History[1]  Surgical History[2]  Family History[3]  Social History[4]    Physical Exam   ED Triage Vitals [08/03/25 1313]   Temperature Heart Rate Respirations BP   36.5 °C (97.7 °F) 84 16 140/86      Pulse Ox Temp Source Heart Rate Source Patient Position   99 % Temporal -- --      BP Location FiO2 (%)     -- --       Physical Exam  Vitals and nursing note reviewed.   Constitutional:       General: He is not in acute distress.     Appearance: Normal appearance. He is not ill-appearing or toxic-appearing.   HENT:      Head: Normocephalic and atraumatic.     Eyes:      Extraocular Movements: Extraocular movements intact.      Conjunctiva/sclera: Conjunctivae normal.      Pupils: Pupils are equal, round, and reactive to light.       Cardiovascular:      Rate and Rhythm: Regular rhythm.      Pulses: Normal pulses.      Heart sounds: Normal heart sounds.   Pulmonary:      Effort: Pulmonary effort is normal. No  respiratory distress.      Breath sounds: Normal breath sounds.   Abdominal:      General: Abdomen is flat. There is no distension.   Genitourinary:     Rectum: Tenderness present.      Comments: On exam to the 9 o'clock position patient has a very large tender erythematous mass.  Concerning for potential abscess versus hemorrhoid.  There is no drainage of the area.  No surrounding erythema to the skin    Musculoskeletal:         General: Normal range of motion.      Cervical back: Normal range of motion and neck supple.     Skin:     General: Skin is warm and dry.     Neurological:      General: No focal deficit present.      Mental Status: He is alert and oriented to person, place, and time.     Psychiatric:         Mood and Affect: Mood normal.         Behavior: Behavior normal.         Thought Content: Thought content normal.           ED Course & MDM   Diagnoses as of 08/03/25 1510   Generalized abdominal pain   Perianal abscess                 No data recorded     Jerry Coma Scale Score: 15 (08/03/25 1311 : Steph Corona RN)                           Medical Decision Making  Summary:  Medical Decision Making:   Patient presented as described in HPI. Patient case including ROS, PE, and treatment and plan discussed with ED attending if attached as cosigner. Results from labs and or imaging included below if completed. Maciej Felipe  is a 45 y.o. coming in for Patient presents with:  Abdominal Pain: Pt states having abdominal pain, went to urgent care and was diagnosed with the hemorrhoid. Pt also reports fevers and no BM for 2 days  Hemorrhoids  Constipation  .  Patient is examined.  He does have a large skin colored mass to the 9 o'clock position of the rectum.  It is very tender.  There is no blue discoloration and or concern for a thrombosed hemorrhoid however I am concerned that it is potentially an abscess.  Lab work is complete on the patient.  There is slight white cell count elevation at 12.  He  reports body aches and chills.  Concern for possibly developing infection.  CT was completed and shows what radiology is classifying as a potential hemorrhoid however again I am concerned this may be abscess.  Consulted surgery who stated that they could I&D the area tomorrow in the OR.  Recommend putting patient on Unasyn.  Also recommended the sitz bath.  Patient will be hospitalized under the medicine team secondary to the concerns and complaint.      Shared decision making was completed for required hospital stay. I also explained the plan and treatment course. Patient/guardian is in agreement with plan, treatment course, and state that they will comply.    Labs Reviewed  CBC WITH AUTO DIFFERENTIAL - Abnormal     WBC                           12.1 (*)               nRBC                          0.0                    RBC                           4.76                   Hemoglobin                    14.3                   Hematocrit                    41.8                   MCV                           88                     MCH                           30.0                   MCHC                          34.2                   RDW                           12.7                   Platelets                     301                    Neutrophils %                 83.1                   Immature Granulocytes %, Automated   0.3                    Lymphocytes %                 8.3                    Monocytes %                   7.9                    Eosinophils %                 0.2                    Basophils %                   0.2                    Neutrophils Absolute          10.08 (*)               Immature Granulocytes Absolute, Au*   0.04                   Lymphocytes Absolute          1.01 (*)               Monocytes Absolute            0.96                   Eosinophils Absolute          0.02                   Basophils Absolute            0.02                COMPREHENSIVE METABOLIC PANEL - Abnormal      Glucose                       98                     Sodium                        130 (*)                Potassium                     4.0                    Chloride                      96 (*)                 Bicarbonate                   24                     Anion Gap                     14                     Urea Nitrogen                 6                      Creatinine                    0.68                   eGFR                          >90                    Calcium                       8.7                    Albumin                       4.1                    Alkaline Phosphatase          56                     Total Protein                 6.8                    AST                           20                     Bilirubin, Total              1.0                    ALT                           29                  LACTATE - Normal   CT abdomen pelvis w IV contrast   Final Result    Low-density areas around the anus that may represent the patient's    known hemorrhoids.          The exam is limited by significant motion artifact.          MACRO:    none          Signed by: Bri Hubbard 8/3/2025 2:08 PM    Dictation workstation:   [id number]                            Tests/Medications/Escalations of Care considered but not given: As in MDM    Patient care discussed with: N/A  Social Determinants affecting care: N/A    Final diagnosis and disposition as documented     Diagnoses as of 08/03/25 1508  Acute hemorrhoid       Shared decision making was completed and determined that patient will be hospitalized. I discussed the differential; results and admit plan with the patient and/or family/friend/caregiver if present.  I emphasized the importance of hospitalization need for re-evaluation/continued monitoring/interventions. They agreed that if they feel their condition is worsening or if they have any other concern they should alert staff immediately for further assistance. I gave the patient an  opportunity to ask all questions they had and answered all of them accordingly. The patient and/or family/friend/caregiver expressed understanding verbally and that they would comply.    Disposition:  Hospitalize to medical floor under Dr. Gloria per their orders. Discussed findings and treatment done here in ED with admitting physician. It would be a risk to discharge the patient in their condition due to possibility of deterioration in their condition and the need for urgent interventions.    This note has been transcribed using voice recognition and may contain grammatical errors, misplaced words, incorrect words, incorrect phrases or other errors.         Procedure  Procedures       [1]   Past Medical History:  Diagnosis Date    Lung nodules 08/2024    RML-ketty 1 yr chest ct w/o contrast 8/2025    Mucocele of lip    [2]   Past Surgical History:  Procedure Laterality Date    VASECTOMY N/A 01/20/2024   [3]   Family History  Problem Relation Name Age of Onset    No Known Problems Mother      No Known Problems Father      No Known Problems Sister      No Known Problems Sister      No Known Problems Daughter      No Known Problems Son     [4]   Social History  Tobacco Use    Smoking status: Every Day     Current packs/day: 1.00     Average packs/day: 1 pack/day for 15.0 years (15.0 ttl pk-yrs)     Types: Cigarettes    Smokeless tobacco: Never   Vaping Use    Vaping status: Never Used   Substance Use Topics    Alcohol use: Yes     Alcohol/week: 30.0 standard drinks of alcohol     Types: 30 Cans of beer per week    Drug use: Never        Magno Gaytan PA-C  08/03/25 0009

## 2025-08-03 NOTE — ED TRIAGE NOTES
Pt states having abdominal pain, went to urgent care and was diagnosed with the hemorrhoid. Pt also reports fevers and no BM for 2 days

## 2025-08-03 NOTE — DISCHARGE INSTRUCTIONS
Use your sitz bath's 4 times a day.  Place a feminine pad in the area and wear your mesh undergarment to keep in place.  The area will eventually heal.

## 2025-08-04 ENCOUNTER — ANESTHESIA (OUTPATIENT)
Dept: OPERATING ROOM | Facility: HOSPITAL | Age: 45
DRG: 395 | End: 2025-08-04
Payer: COMMERCIAL

## 2025-08-04 ENCOUNTER — ANESTHESIA EVENT (OUTPATIENT)
Dept: OPERATING ROOM | Facility: HOSPITAL | Age: 45
DRG: 395 | End: 2025-08-04
Payer: COMMERCIAL

## 2025-08-04 PROBLEM — F17.200 TOBACCO DEPENDENCE: Status: ACTIVE | Noted: 2025-08-04

## 2025-08-04 PROBLEM — K61.0 PERIANAL ABSCESS: Status: ACTIVE | Noted: 2025-08-03

## 2025-08-04 PROBLEM — K64.9 HEMORRHOIDS: Status: ACTIVE | Noted: 2025-08-04

## 2025-08-04 PROBLEM — K59.00 CONSTIPATION: Status: ACTIVE | Noted: 2025-08-04

## 2025-08-04 PROBLEM — F10.90 ALCOHOL USE DISORDER: Status: ACTIVE | Noted: 2025-08-04

## 2025-08-04 LAB
ANION GAP SERPL CALC-SCNC: 12 MMOL/L (ref 10–20)
BUN SERPL-MCNC: 6 MG/DL (ref 6–23)
CALCIUM SERPL-MCNC: 8.5 MG/DL (ref 8.6–10.3)
CHLORIDE SERPL-SCNC: 104 MMOL/L (ref 98–107)
CO2 SERPL-SCNC: 26 MMOL/L (ref 21–32)
CREAT SERPL-MCNC: 0.63 MG/DL (ref 0.5–1.3)
EGFRCR SERPLBLD CKD-EPI 2021: >90 ML/MIN/1.73M*2
ERYTHROCYTE [DISTWIDTH] IN BLOOD BY AUTOMATED COUNT: 12.9 % (ref 11.5–14.5)
GLUCOSE SERPL-MCNC: 95 MG/DL (ref 74–99)
HCT VFR BLD AUTO: 43.2 % (ref 41–52)
HGB BLD-MCNC: 14.3 G/DL (ref 13.5–17.5)
MCH RBC QN AUTO: 29.5 PG (ref 26–34)
MCHC RBC AUTO-ENTMCNC: 33.1 G/DL (ref 32–36)
MCV RBC AUTO: 89 FL (ref 80–100)
NRBC BLD-RTO: 0 /100 WBCS (ref 0–0)
PLATELET # BLD AUTO: 316 X10*3/UL (ref 150–450)
POTASSIUM SERPL-SCNC: 4 MMOL/L (ref 3.5–5.3)
RBC # BLD AUTO: 4.84 X10*6/UL (ref 4.5–5.9)
SODIUM SERPL-SCNC: 138 MMOL/L (ref 136–145)
WBC # BLD AUTO: 7.2 X10*3/UL (ref 4.4–11.3)

## 2025-08-04 PROCEDURE — 3700000002 HC GENERAL ANESTHESIA TIME - EACH INCREMENTAL 1 MINUTE: Performed by: SURGERY

## 2025-08-04 PROCEDURE — 7100000001 HC RECOVERY ROOM TIME - INITIAL BASE CHARGE: Performed by: SURGERY

## 2025-08-04 PROCEDURE — 2500000004 HC RX 250 GENERAL PHARMACY W/ HCPCS (ALT 636 FOR OP/ED): Performed by: NURSE PRACTITIONER

## 2025-08-04 PROCEDURE — 99223 1ST HOSP IP/OBS HIGH 75: CPT | Performed by: NURSE PRACTITIONER

## 2025-08-04 PROCEDURE — 2500000001 HC RX 250 WO HCPCS SELF ADMINISTERED DRUGS (ALT 637 FOR MEDICARE OP): Mod: IPSPLIT | Performed by: NURSE PRACTITIONER

## 2025-08-04 PROCEDURE — 3700000001 HC GENERAL ANESTHESIA TIME - INITIAL BASE CHARGE: Performed by: SURGERY

## 2025-08-04 PROCEDURE — 87070 CULTURE OTHR SPECIMN AEROBIC: CPT | Mod: GENLAB | Performed by: SURGERY

## 2025-08-04 PROCEDURE — 7100000002 HC RECOVERY ROOM TIME - EACH INCREMENTAL 1 MINUTE: Performed by: SURGERY

## 2025-08-04 PROCEDURE — 80048 BASIC METABOLIC PNL TOTAL CA: CPT | Performed by: NURSE PRACTITIONER

## 2025-08-04 PROCEDURE — 3600000003 HC OR TIME - INITIAL BASE CHARGE - PROCEDURE LEVEL THREE: Performed by: SURGERY

## 2025-08-04 PROCEDURE — 94760 N-INVAS EAR/PLS OXIMETRY 1: CPT

## 2025-08-04 PROCEDURE — 99222 1ST HOSP IP/OBS MODERATE 55: CPT | Performed by: SURGERY

## 2025-08-04 PROCEDURE — 85027 COMPLETE CBC AUTOMATED: CPT | Performed by: NURSE PRACTITIONER

## 2025-08-04 PROCEDURE — 2500000002 HC RX 250 W HCPCS SELF ADMINISTERED DRUGS (ALT 637 FOR MEDICARE OP, ALT 636 FOR OP/ED): Performed by: NURSE PRACTITIONER

## 2025-08-04 PROCEDURE — 0H98XZZ DRAINAGE OF BUTTOCK SKIN, EXTERNAL APPROACH: ICD-10-PCS | Performed by: SURGERY

## 2025-08-04 PROCEDURE — 2500000004 HC RX 250 GENERAL PHARMACY W/ HCPCS (ALT 636 FOR OP/ED): Performed by: NURSE ANESTHETIST, CERTIFIED REGISTERED

## 2025-08-04 PROCEDURE — 46040 I&D ISCHIORCT&/PERIRCT ABSC: CPT | Performed by: SURGERY

## 2025-08-04 PROCEDURE — 36415 COLL VENOUS BLD VENIPUNCTURE: CPT | Performed by: NURSE PRACTITIONER

## 2025-08-04 PROCEDURE — S4991 NICOTINE PATCH NONLEGEND: HCPCS | Performed by: NURSE PRACTITIONER

## 2025-08-04 PROCEDURE — 2500000004 HC RX 250 GENERAL PHARMACY W/ HCPCS (ALT 636 FOR OP/ED): Performed by: INTERNAL MEDICINE

## 2025-08-04 PROCEDURE — 3600000008 HC OR TIME - EACH INCREMENTAL 1 MINUTE - PROCEDURE LEVEL THREE: Performed by: SURGERY

## 2025-08-04 PROCEDURE — 1100000001 HC PRIVATE ROOM DAILY: Mod: IPSPLIT

## 2025-08-04 PROCEDURE — 2500000004 HC RX 250 GENERAL PHARMACY W/ HCPCS (ALT 636 FOR OP/ED): Performed by: SURGERY

## 2025-08-04 PROCEDURE — 46040 I&D ISCHIORCT&/PERIRCT ABSC: CPT

## 2025-08-04 PROCEDURE — 2720000007 HC OR 272 NO HCPCS: Performed by: SURGERY

## 2025-08-04 RX ORDER — KETOROLAC TROMETHAMINE 30 MG/ML
INJECTION, SOLUTION INTRAMUSCULAR; INTRAVENOUS AS NEEDED
Status: DISCONTINUED | OUTPATIENT
Start: 2025-08-04 | End: 2025-08-04

## 2025-08-04 RX ORDER — SODIUM CHLORIDE 9 MG/ML
10 INJECTION, SOLUTION INTRAVENOUS CONTINUOUS PRN
Status: DISCONTINUED | OUTPATIENT
Start: 2025-08-04 | End: 2025-08-05 | Stop reason: HOSPADM

## 2025-08-04 RX ORDER — SODIUM CHLORIDE 9 MG/ML
INJECTION, SOLUTION INTRAVENOUS
Status: COMPLETED
Start: 2025-08-04 | End: 2025-08-04

## 2025-08-04 RX ORDER — PROPOFOL 10 MG/ML
INJECTION, EMULSION INTRAVENOUS AS NEEDED
Status: DISCONTINUED | OUTPATIENT
Start: 2025-08-04 | End: 2025-08-04

## 2025-08-04 RX ORDER — LIDOCAINE HYDROCHLORIDE 20 MG/ML
INJECTION, SOLUTION EPIDURAL; INFILTRATION; INTRACAUDAL; PERINEURAL AS NEEDED
Status: DISCONTINUED | OUTPATIENT
Start: 2025-08-04 | End: 2025-08-04

## 2025-08-04 RX ORDER — MIDAZOLAM HYDROCHLORIDE 2 MG/2ML
INJECTION, SOLUTION INTRAMUSCULAR; INTRAVENOUS AS NEEDED
Status: DISCONTINUED | OUTPATIENT
Start: 2025-08-04 | End: 2025-08-04

## 2025-08-04 RX ORDER — AMPICILLIN AND SULBACTAM 2; 1 G/1; G/1
INJECTION, POWDER, FOR SOLUTION INTRAMUSCULAR; INTRAVENOUS
Status: COMPLETED
Start: 2025-08-04 | End: 2025-08-04

## 2025-08-04 RX ORDER — FENTANYL CITRATE 50 UG/ML
INJECTION, SOLUTION INTRAMUSCULAR; INTRAVENOUS AS NEEDED
Status: DISCONTINUED | OUTPATIENT
Start: 2025-08-04 | End: 2025-08-04

## 2025-08-04 RX ORDER — SODIUM CHLORIDE, SODIUM LACTATE, POTASSIUM CHLORIDE, CALCIUM CHLORIDE 600; 310; 30; 20 MG/100ML; MG/100ML; MG/100ML; MG/100ML
INJECTION, SOLUTION INTRAVENOUS CONTINUOUS PRN
Status: DISCONTINUED | OUTPATIENT
Start: 2025-08-04 | End: 2025-08-04

## 2025-08-04 RX ADMIN — HYDROMORPHONE HYDROCHLORIDE 0.5 MG: 1 INJECTION, SOLUTION INTRAMUSCULAR; INTRAVENOUS; SUBCUTANEOUS at 10:44

## 2025-08-04 RX ADMIN — MIDAZOLAM HYDROCHLORIDE 2 MG: 1 INJECTION, SOLUTION INTRAMUSCULAR; INTRAVENOUS at 10:25

## 2025-08-04 RX ADMIN — SENNOSIDES AND DOCUSATE SODIUM 1 TABLET: 50; 8.6 TABLET ORAL at 20:42

## 2025-08-04 RX ADMIN — SODIUM CHLORIDE, POTASSIUM CHLORIDE, SODIUM LACTATE AND CALCIUM CHLORIDE: 600; 310; 30; 20 INJECTION, SOLUTION INTRAVENOUS at 10:21

## 2025-08-04 RX ADMIN — SODIUM CHLORIDE 3 G: 9 INJECTION, SOLUTION INTRAVENOUS at 20:42

## 2025-08-04 RX ADMIN — SODIUM CHLORIDE 3 G: 9 INJECTION, SOLUTION INTRAVENOUS at 15:40

## 2025-08-04 RX ADMIN — NICOTINE 1 PATCH: 14 PATCH, EXTENDED RELEASE TRANSDERMAL at 08:34

## 2025-08-04 RX ADMIN — LIDOCAINE HYDROCHLORIDE 40 MG: 20 INJECTION, SOLUTION EPIDURAL; INFILTRATION; INTRACAUDAL; PERINEURAL at 10:25

## 2025-08-04 RX ADMIN — SODIUM CHLORIDE 10 ML/HR: 9 INJECTION, SOLUTION INTRAVENOUS at 08:44

## 2025-08-04 RX ADMIN — PROPOFOL 200 MG: 10 INJECTION, EMULSION INTRAVENOUS at 10:25

## 2025-08-04 RX ADMIN — FENTANYL CITRATE 50 MCG: 50 INJECTION, SOLUTION INTRAMUSCULAR; INTRAVENOUS at 10:25

## 2025-08-04 RX ADMIN — SODIUM CHLORIDE 3 G: 9 INJECTION, SOLUTION INTRAVENOUS at 08:34

## 2025-08-04 RX ADMIN — DEXAMETHASONE SODIUM PHOSPHATE 4 MG: 4 INJECTION, SOLUTION INTRAMUSCULAR; INTRAVENOUS at 10:25

## 2025-08-04 RX ADMIN — AMPICILLIN AND SULBACTAM 3 G: 2; 1 INJECTION, POWDER, FOR SOLUTION INTRAMUSCULAR; INTRAVENOUS at 03:39

## 2025-08-04 RX ADMIN — KETOROLAC TROMETHAMINE 30 MG: 30 INJECTION, SOLUTION INTRAMUSCULAR; INTRAVENOUS at 10:25

## 2025-08-04 RX ADMIN — FENTANYL CITRATE 50 MCG: 50 INJECTION, SOLUTION INTRAMUSCULAR; INTRAVENOUS at 10:40

## 2025-08-04 RX ADMIN — ONDANSETRON 4 MG: 2 INJECTION, SOLUTION INTRAMUSCULAR; INTRAVENOUS at 10:25

## 2025-08-04 SDOH — HEALTH STABILITY: MENTAL HEALTH: CURRENT SMOKER: 1

## 2025-08-04 ASSESSMENT — LIFESTYLE VARIABLES
AUDITORY DISTURBANCES: NOT PRESENT
ANXIETY: NO ANXIETY, AT EASE
ORIENTATION AND CLOUDING OF SENSORIUM: ORIENTED AND CAN DO SERIAL ADDITIONS
VISUAL DISTURBANCES: NOT PRESENT
ANXIETY: NO ANXIETY, AT EASE
AGITATION: NORMAL ACTIVITY
TREMOR: NO TREMOR
HEADACHE, FULLNESS IN HEAD: NOT PRESENT
AGITATION: NORMAL ACTIVITY
ANXIETY: NO ANXIETY, AT EASE
ORIENTATION AND CLOUDING OF SENSORIUM: ORIENTED AND CAN DO SERIAL ADDITIONS
NAUSEA AND VOMITING: NO NAUSEA AND NO VOMITING
PAROXYSMAL SWEATS: NO SWEAT VISIBLE
NAUSEA AND VOMITING: NO NAUSEA AND NO VOMITING
TOTAL SCORE: 0
PAROXYSMAL SWEATS: NO SWEAT VISIBLE
ORIENTATION AND CLOUDING OF SENSORIUM: ORIENTED AND CAN DO SERIAL ADDITIONS
HEADACHE, FULLNESS IN HEAD: NOT PRESENT
VISUAL DISTURBANCES: NOT PRESENT
VISUAL DISTURBANCES: NOT PRESENT
ANXIETY: NO ANXIETY, AT EASE
AGITATION: NORMAL ACTIVITY
ORIENTATION AND CLOUDING OF SENSORIUM: ORIENTED AND CAN DO SERIAL ADDITIONS
ORIENTATION AND CLOUDING OF SENSORIUM: ORIENTED AND CAN DO SERIAL ADDITIONS
AUDITORY DISTURBANCES: NOT PRESENT
HEADACHE, FULLNESS IN HEAD: NOT PRESENT
HEADACHE, FULLNESS IN HEAD: NOT PRESENT
AGITATION: NORMAL ACTIVITY
HEADACHE, FULLNESS IN HEAD: NOT PRESENT
AUDITORY DISTURBANCES: NOT PRESENT
TOTAL SCORE: 0
AUDITORY DISTURBANCES: NOT PRESENT
TREMOR: NO TREMOR
TOTAL SCORE: 0
PAROXYSMAL SWEATS: NO SWEAT VISIBLE
NAUSEA AND VOMITING: NO NAUSEA AND NO VOMITING
VISUAL DISTURBANCES: NOT PRESENT
AUDITORY DISTURBANCES: NOT PRESENT
TREMOR: NO TREMOR
ANXIETY: NO ANXIETY, AT EASE
AUDITORY DISTURBANCES: NOT PRESENT
TREMOR: NO TREMOR
AGITATION: NORMAL ACTIVITY
VISUAL DISTURBANCES: NOT PRESENT
TOTAL SCORE: 0
PAROXYSMAL SWEATS: NO SWEAT VISIBLE
AUDITORY DISTURBANCES: NOT PRESENT
ANXIETY: NO ANXIETY, AT EASE
NAUSEA AND VOMITING: NO NAUSEA AND NO VOMITING
ORIENTATION AND CLOUDING OF SENSORIUM: ORIENTED AND CAN DO SERIAL ADDITIONS
AGITATION: NORMAL ACTIVITY
VISUAL DISTURBANCES: NOT PRESENT
TOTAL SCORE: 0
VISUAL DISTURBANCES: NOT PRESENT
NAUSEA AND VOMITING: NO NAUSEA AND NO VOMITING
NAUSEA AND VOMITING: NO NAUSEA AND NO VOMITING
ANXIETY: NO ANXIETY, AT EASE
ORIENTATION AND CLOUDING OF SENSORIUM: ORIENTED AND CAN DO SERIAL ADDITIONS
AGITATION: NORMAL ACTIVITY
PAROXYSMAL SWEATS: NO SWEAT VISIBLE
HEADACHE, FULLNESS IN HEAD: NOT PRESENT
PAROXYSMAL SWEATS: NO SWEAT VISIBLE
TOTAL SCORE: 0
TOTAL SCORE: 0
HEADACHE, FULLNESS IN HEAD: NOT PRESENT
ORIENTATION AND CLOUDING OF SENSORIUM: ORIENTED AND CAN DO SERIAL ADDITIONS
TREMOR: NO TREMOR
PAROXYSMAL SWEATS: NO SWEAT VISIBLE
TOTAL SCORE: 0
PAROXYSMAL SWEATS: NO SWEAT VISIBLE
ANXIETY: NO ANXIETY, AT EASE
TOTAL SCORE: 0
AUDITORY DISTURBANCES: NOT PRESENT
ANXIETY: NO ANXIETY, AT EASE
ORIENTATION AND CLOUDING OF SENSORIUM: ORIENTED AND CAN DO SERIAL ADDITIONS
TREMOR: NO TREMOR
ANXIETY: NO ANXIETY, AT EASE
TREMOR: NO TREMOR
AUDITORY DISTURBANCES: NOT PRESENT
AGITATION: NORMAL ACTIVITY
VISUAL DISTURBANCES: NOT PRESENT
NAUSEA AND VOMITING: NO NAUSEA AND NO VOMITING
AUDITORY DISTURBANCES: NOT PRESENT
PAROXYSMAL SWEATS: NO SWEAT VISIBLE
VISUAL DISTURBANCES: NOT PRESENT
TREMOR: NO TREMOR
NAUSEA AND VOMITING: NO NAUSEA AND NO VOMITING
TREMOR: NO TREMOR
HEADACHE, FULLNESS IN HEAD: NOT PRESENT
TOTAL SCORE: 0
AGITATION: NORMAL ACTIVITY
HEADACHE, FULLNESS IN HEAD: NOT PRESENT
HEADACHE, FULLNESS IN HEAD: NOT PRESENT
AGITATION: NORMAL ACTIVITY
TREMOR: NO TREMOR
PAROXYSMAL SWEATS: NO SWEAT VISIBLE
ORIENTATION AND CLOUDING OF SENSORIUM: ORIENTED AND CAN DO SERIAL ADDITIONS
VISUAL DISTURBANCES: NOT PRESENT

## 2025-08-04 ASSESSMENT — ENCOUNTER SYMPTOMS
CONSTITUTIONAL NEGATIVE: 1
ALLERGIC/IMMUNOLOGIC NEGATIVE: 1
ABDOMINAL DISTENTION: 0
EYES NEGATIVE: 1
ENDOCRINE NEGATIVE: 1
RECTAL PAIN: 1
DIARRHEA: 0
ABDOMINAL PAIN: 1
MUSCULOSKELETAL NEGATIVE: 1
PSYCHIATRIC NEGATIVE: 1
RESPIRATORY NEGATIVE: 1
NAUSEA: 0
VOMITING: 0
CARDIOVASCULAR NEGATIVE: 1
CONSTIPATION: 1
NEUROLOGICAL NEGATIVE: 1
HEMATOLOGIC/LYMPHATIC NEGATIVE: 1

## 2025-08-04 ASSESSMENT — PAIN SCALES - GENERAL
PAINLEVEL_OUTOF10: 0 - NO PAIN
PAIN_LEVEL: 0
PAINLEVEL_OUTOF10: 0 - NO PAIN

## 2025-08-04 ASSESSMENT — PAIN - FUNCTIONAL ASSESSMENT
PAIN_FUNCTIONAL_ASSESSMENT: 0-10
PAIN_FUNCTIONAL_ASSESSMENT: UNABLE TO SELF-REPORT
PAIN_FUNCTIONAL_ASSESSMENT: UNABLE TO SELF-REPORT
PAIN_FUNCTIONAL_ASSESSMENT: 0-10

## 2025-08-04 ASSESSMENT — COGNITIVE AND FUNCTIONAL STATUS - GENERAL
MOBILITY SCORE: 24
DAILY ACTIVITIY SCORE: 24

## 2025-08-04 NOTE — ANESTHESIA PREPROCEDURE EVALUATION
Patient: Maciej Felipe    Procedure Information       Date/Time: 08/04/25 1100    Procedure: INCISION AND DRAINAGE, ABSCESS, ANORECTAL    Location: [facility] / [facility]    Surgeons: Jin Hernandez MD            Relevant Problems   Anesthesia (within normal limits)      Cardiac (within normal limits)      Pulmonary (within normal limits)      Neuro (within normal limits)      GI (within normal limits)      /Renal (within normal limits)      Liver (within normal limits)      Endocrine (within normal limits)      Hematology (within normal limits)      Musculoskeletal (within normal limits)      HEENT (within normal limits)      ID (within normal limits)      Skin (within normal limits)      GYN (within normal limits)      Circulatory   (+) Hemorrhoids      Digestive   (+) Sara-rectal abscess      Tobacco   (+) Tobacco dependence      Mental Health   (+) Alcohol use disorder     Vitals:    08/04/25 0800   BP:    Pulse: 67   Resp:    Temp:    SpO2: 95%       Surgical History[1]  Medical History[2]  Current Medications[3]  Prior to Admission medications   Medication Sig Start Date End Date Taking? Authorizing Provider   docusate sodium (Colace) 100 mg capsule Take 1 capsule (100 mg) by mouth 2 times a day. 8/1/25 8/31/25 Yes Sheree Dupree PA-C   lidocaine HCl-hydrocortison ac 3-0.5 % cream Apply topically 3 times a day x 3-5 days 8/1/25  Yes Sheree Dupree PA-C   magnesium hydroxide (Milk of Magnesia) 400 mg/5 mL suspension Take 30 mL by mouth once daily as needed for constipation for up to 10 days. 8/1/25 8/11/25 Yes Sheree Dupree PA-C   pantoprazole (ProtoNix) 20 mg EC tablet Take 1 tablet (20 mg) by mouth once daily. Do not crush, chew, or split. 6/17/25 9/15/25 Yes Yaritza Vera MD   psyllium (Metamucil) 0.4 gram capsule Take 5 capsules by mouth 4 times a day.   Yes Historical Provider, MD   amoxicillin-pot clavulanate (Augmentin) 875-125 mg tablet Take 1 tablet (875 mg) by mouth 2  "times a day.  Patient not taking: Reported on 8/1/2025 2/24/25   Sheree Dupree PA-C   b complex vitamins capsule Take 1 capsule by mouth once daily.    Historical Provider, MD     RX Allergies[4]  Social History     Tobacco Use    Smoking status: Every Day     Current packs/day: 1.00     Average packs/day: 1 pack/day for 15.0 years (15.0 ttl pk-yrs)     Types: Cigarettes    Smokeless tobacco: Never   Substance Use Topics    Alcohol use: Yes     Alcohol/week: 30.0 standard drinks of alcohol     Types: 30 Cans of beer per week         Chemistry    Lab Results   Component Value Date/Time     08/04/2025 0738    K 4.0 08/04/2025 0738     08/04/2025 0738    CO2 26 08/04/2025 0738    BUN 6 08/04/2025 0738    CREATININE 0.63 08/04/2025 0738    Lab Results   Component Value Date/Time    CALCIUM 8.5 (L) 08/04/2025 0738    ALKPHOS 56 08/03/2025 1352    AST 20 08/03/2025 1352    ALT 29 08/03/2025 1352    BILITOT 1.0 08/03/2025 1352          Lab Results   Component Value Date/Time    WBC 7.2 08/04/2025 0739    HGB 14.3 08/04/2025 0739    HCT 43.2 08/04/2025 0739     08/04/2025 0739     No results found for: \"PROTIME\", \"PTT\", \"INR\"  No results found for this or any previous visit (from the past 4464 hours).  No results found for this or any previous visit from the past 1095 days.    Clinical information reviewed:   Tobacco  Allergies  Meds  Problems  Med Hx  Surg Hx   Fam Hx  Soc   Hx        NPO Detail:  No data recorded     Physical Exam    Airway  Mallampati: II     Cardiovascular - normal exam   Dental    Pulmonary - normal exam   Abdominal - normal exam           Anesthesia Plan    History of general anesthesia?: no  History of complications of general anesthesia?: no    ASA 2     general     The patient is a current smoker.  Patient was previously instructed to abstain from smoking on day of procedure.  Patient did not smoke on day of procedure.    intravenous induction   Anesthetic plan " and risks discussed with patient.           [1]   Past Surgical History:  Procedure Laterality Date    VASECTOMY N/A 01/20/2024   [2]   Past Medical History:  Diagnosis Date    Alcohol use disorder 08/04/2025    Constipation 08/04/2025    Hemorrhoids 08/04/2025    Lung nodules 08/2024    Mucocele of lip     Sara-rectal abscess 08/03/2025    Tobacco dependence 08/04/2025   [3]   Current Facility-Administered Medications:     acetaminophen (Tylenol) tablet 650 mg, 650 mg, oral, q4h PRN, Talia L Deepthi, APRN-CNP    ampicillin-sulbactam (Unasyn) 3 g in sodium chloride 0.9%  mL, 3 g, intravenous, q6h, Talia L Deepthi, APRN-CNP, Stopped at 08/04/25 0948    calcium carbonate (Tums) 500 mg (200 mg elemental) chewable tablet 1 tablet, 500 mg of calcium carbonate, oral, 4x daily PRN, Talia L Deepthi, APRN-CNP    diazePAM (Valium) tablet 10 mg, 10 mg, oral, q2h PRN, Talia L Deepthi, APRN-CNP    folic acid (Folvite) tablet 1 mg, 1 mg, oral, Daily, Talia L Deepthi, APRN-CNP, 1 mg at 08/03/25 1706    HYDROmorphone (Dilaudid) injection 0.5 mg, 0.5 mg, intravenous, q3h PRN, Talia L Deepthi, APRN-CNP    ketorolac (Toradol) injection 30 mg, 30 mg, intravenous, q6h PRN, Jin Hernandez MD, 30 mg at 08/03/25 1806    melatonin tablet 5 mg, 5 mg, oral, Nightly PRN, Talia L Deepthi, APRN-CNP    multivitamin tablet 1 tablet, 1 tablet, oral, Daily, Talia L Deepthi, APRN-CNP, 1 tablet at 08/03/25 1706    nicotine (Nicoderm CQ) 14 mg/24 hr patch 1 patch, 1 patch, transdermal, Daily, Talia L Deepthi, APRN-CNP, 1 patch at 08/04/25 0834    ondansetron (Zofran) tablet 4 mg, 4 mg, oral, q8h PRN **OR** ondansetron (Zofran) injection 4 mg, 4 mg, intravenous, q8h PRN, Talia Jimenesasia, APRN-CNP    oxyCODONE (Roxicodone) immediate release tablet 5 mg, 5 mg, oral, q4h PRN, Talia Jimenesasia, APRN-CNP    pantoprazole (ProtoNix) EC tablet 40 mg, 40 mg, oral, Daily before breakfast **OR** pantoprazole (Protonix)  injection 40 mg, 40 mg, intravenous, Daily before breakfast, Talia Lacey, APRN-CNP    sennosides-docusate sodium (Sara-Colace) 8.6-50 mg per tablet 1 tablet, 1 tablet, oral, Nightly PRN, Talia Lacey, APRN-CNP    sodium chloride 0.9% infusion, 10 mL/hr, intravenous, Continuous PRN, Sharad Kendall MD, Last Rate: 10 mL/hr at 08/04/25 0844, 10 mL/hr at 08/04/25 0844    thiamine (Vitamin B-1) tablet 100 mg, 100 mg, oral, Daily, Talia Lacey, APRN-CNP, 100 mg at 08/03/25 1706  [4]   Allergies  Allergen Reactions    Shellfish Derived Cough, Dizziness, Hives, Itching, Rash, Shortness of breath and Swelling

## 2025-08-04 NOTE — OP NOTE
INCISION AND DRAINAGE, ABSCESS, ANORECTAL Operative Note     Date: 8/3/2025 - 2025  OR Location: [facility]    Name: Maciej Felipe, : 1980, Age: 45 y.o., MRN: [id number], Sex: male    Diagnosis  Pre-op Diagnosis      * Perianal abscess [K61.0]     * Sara-rectal abscess [K61.1] Post-op Diagnosis     * Perianal abscess [K61.0]     * Sara-rectal abscess [K61.1]     Procedures  INCISION AND DRAINAGE, ABSCESS, ANORECTAL  22809 - VT I&D ISCHIORECTAL&/PERIRECTAL ABSCESS SPX      Surgeons      * Jin Hernandez - Primary    Resident/Fellow/Other Assistant:  Surgeons and Role:     * Dory Hurley PA-C - Assisting    Staff:   Circulator: Ivory  Circulator: Judith Daugherty Person: Irish    Anesthesia Staff: CRNA: TOO Vizcarra-GIANNI    Procedure Summary  Anesthesia: General  ASA: II  Estimated Blood Loss: 1mL  Intra-op Medications: Administrations occurring from 1100 to 1210 on 25:  * No intraprocedure medications in log *        Specimen:   ID Type Source Tests Collected by Time   A : perirectal abscess- with gram stain Fluid ABSCESS STERILE FLUID CULTURE/SMEAR Jin Hernandez MD 2025 1043                 Drains and/or Catheters: * None in log *    Tourniquet Times:         Implants:     Findings: 3 x 2 x 2 cm posterior perirectal abscess    Indications: Maciej Felipe is an 45 y.o. male who is having surgery for Perianal abscess [K61.0]  Sara-rectal abscess [K61.1].  The patient presented the emergency room after a 12-day history of perianal pain he had significant discomfort and swelling.  He was seen in the emergency room and a CT scan was performed which confirmed no obvious abnormalities except for some possible hemorrhoidal tissue.  He did complain of significant perianal swelling.  He had some drainage from the area overnight which was in fact pus mixed with blood.  He had evidence of an obvious perianal/perirectal abscess posteriorly.    The patient was seen in the preoperative area. The risks,  benefits, complications, treatment options, non-operative alternatives, expected recovery and outcomes were discussed with the patient. The possibilities of reaction to medication, pulmonary aspiration, injury to surrounding structures, bleeding, recurrent infection, the need for additional procedures, failure to diagnose a condition, and creating a complication requiring transfusion or operation were discussed with the patient. The patient concurred with the proposed plan, giving informed consent.  The site of surgery was properly noted/marked if necessary per policy. The patient has been actively warmed in preoperative area. Preoperative antibiotics have been ordered and given within 2 hours of incision. Venous thrombosis prophylaxis have been ordered including bilateral sequential compression devices    Procedure Details: After obtaining informed consent from the patient and discussing all the risks which include but not limited to pain, infection, bleeding, cardiac, pulmonary, neurologic, locomotor, anesthetic events and other unforeseen complications, including death, general anesthesia was induced.  The patient was placed in the lithotomy position.  The perianaL area was examined there was obvious evidence of a posterior abscess.  The area was prepped and draped in aseptic fashion.  The anal speculum was introduced and no obvious opening was noted posteriorly.  I then opened the abscess widely removing excess skin.  This abscess could be felt posteriorly along the distal rectum.  The cavity was irrigated.  Dressings were placed.  Intersphincteric Marcaine was injected.  Patient tolerated the procedure well and discharged to the recovery room in a stable condition.    Evidence of Infection: Yes; Purulent fluid Within the subcutaneous tissues  Complications:  None; patient tolerated the procedure well.    Disposition: PACU - hemodynamically stable.  Condition: stable     The surgical assistant assisted with  positioning, preparation of the surgical site, tissue retraction, suctioning, due to the nature of the case and the difficulty of the case.  The surgical assistant performed a primary closure and dressing application.       Additional Details: Time equals 15 minutes.  Wound classification 4.  Body mass index 28    Attending Attestation: I performed the procedure.    Jin Hernandez  Phone Number: [phone]

## 2025-08-04 NOTE — H&P
History Of Present Illness  Maciej Felipe is a 45 y.o. male with past medical history of external hemorrhoids, constipation, tobacco dependence, alcohol use who presented to the emergency department yesterday with complaints of abdominal pain, fever and rectal pain.  He states he was seen at the urgent care and was diagnosed with external hemorrhoids prior to coming in.  He also stated he had not moved his bowels in 2 days due to the severe pain in the rectum.  He has been taking milk of magnesia at home to assist in moving his bowels.  On exam the ED provider found a large, tender, erythematous mass rectally and Dr. Hernandez was consulted.  His ED workup also showed mild leukocytosis, hyponatremia.  CT abdomen pelvis showed low-density areas around the anus.  He was subsequently admitted to Medicine for further treatment and evaluation and close monitoring of hemodynamic status.     VS: T36.5, HR 84, respiration 16, /86, and SPO2 99%  Significant labs: Sodium 130, WBC 12.1  Diagnostics: CT abdomen pelvis: Low-density areas around the anus that may represent a patient's known hemorrhoids     Past Medical History  Past Medical History:   Diagnosis Date    Alcohol use disorder 08/04/2025    Constipation 08/04/2025    Hemorrhoids 08/04/2025    Lung nodules 08/2024    Mucocele of lip     Sara-rectal abscess 08/03/2025    Tobacco dependence 08/04/2025     Surgical History  Past Surgical History:   Procedure Laterality Date    VASECTOMY N/A 01/20/2024      Social History  He reports that he has been smoking cigarettes. He has a 15 pack-year smoking history. He has never used smokeless tobacco. He reports current alcohol use of about 30.0 standard drinks of alcohol per week. He reports that he does not use drugs.    Family History  Family History   Problem Relation Name Age of Onset    No Known Problems Mother      No Known Problems Father      No Known Problems Sister      No Known Problems Sister      No Known Problems  Daughter      No Known Problems Son        Allergies  Shellfish derived    Review of Systems   Constitutional: Negative.    HENT: Negative.     Eyes: Negative.    Respiratory: Negative.     Cardiovascular: Negative.    Gastrointestinal:  Positive for abdominal pain, constipation and rectal pain. Negative for abdominal distention, diarrhea, nausea and vomiting.   Endocrine: Negative.    Genitourinary: Negative.    Musculoskeletal: Negative.    Allergic/Immunologic: Negative.    Neurological: Negative.    Hematological: Negative.    Psychiatric/Behavioral: Negative.          Physical Exam  Constitutional:       General: He is not in acute distress.     Appearance: Normal appearance. He is not toxic-appearing.   HENT:      Head: Normocephalic and atraumatic.      Mouth/Throat:      Mouth: Mucous membranes are moist.     Eyes:      Extraocular Movements: Extraocular movements intact.      Pupils: Pupils are equal, round, and reactive to light.       Cardiovascular:      Rate and Rhythm: Normal rate and regular rhythm.      Pulses: Normal pulses.      Heart sounds: Normal heart sounds. No murmur heard.     No gallop.   Pulmonary:      Effort: Pulmonary effort is normal. No respiratory distress.      Breath sounds: Normal breath sounds. No wheezing, rhonchi or rales.   Abdominal:      General: Bowel sounds are normal. There is no distension.      Palpations: Abdomen is soft.      Tenderness: There is no abdominal tenderness. There is no guarding or rebound.     Musculoskeletal:         General: No swelling, tenderness, deformity or signs of injury. Normal range of motion.      Cervical back: Normal range of motion and neck supple.     Skin:     General: Skin is warm and dry.      Capillary Refill: Capillary refill takes less than 2 seconds.      Coloration: Skin is not jaundiced.      Findings: No bruising or rash.     Neurological:      General: No focal deficit present.      Mental Status: He is alert and oriented to  "person, place, and time. Mental status is at baseline.      Cranial Nerves: No cranial nerve deficit.      Sensory: No sensory deficit.      Motor: No weakness.      Gait: Gait normal.     Psychiatric:         Mood and Affect: Mood normal.         Behavior: Behavior normal.         Thought Content: Thought content normal.         Judgment: Judgment normal.        Last Recorded Vitals  Blood pressure 105/65, pulse 72, temperature 37.1 °C (98.8 °F), temperature source Temporal, resp. rate 16, height 1.753 m (5' 9\"), weight 87.9 kg (193 lb 12.6 oz), SpO2 95%.    Scheduled medications  Scheduled Medications[1]  Continuous medications  Continuous Medications[2]  PRN medications  PRN Medications[3]    Relevant Results  CT abdomen pelvis w IV contrast  Result Date: 8/3/2025  Low-density areas around the anus that may represent the patient's known hemorrhoids.   The exam is limited by significant motion artifact.   MACRO: none   Signed by: Bri Hubbard 8/3/2025 2:08 PM Dictation workstation:   [id number]      Latest Reference Range & Units 08/03/25 13:52 08/04/25 07:38 08/04/25 07:39   GLUCOSE 74 - 99 mg/dL 98 95    SODIUM 136 - 145 mmol/L 130 (L) 138    POTASSIUM 3.5 - 5.3 mmol/L 4.0 4.0    CHLORIDE 98 - 107 mmol/L 96 (L) 104    Bicarbonate 21 - 32 mmol/L 24 26    Anion Gap 10 - 20 mmol/L 14 12    Blood Urea Nitrogen 6 - 23 mg/dL 6 6    Creatinine 0.50 - 1.30 mg/dL 0.68 0.63    EGFR >60 mL/min/1.73m*2 >90 >90    Calcium 8.6 - 10.3 mg/dL 8.7 8.5 (L)    Albumin 3.4 - 5.0 g/dL 4.1     Alkaline Phosphatase 33 - 120 U/L 56     ALT 10 - 52 U/L 29     AST 9 - 39 U/L 20     Bilirubin Total 0.0 - 1.2 mg/dL 1.0     Total Protein 6.4 - 8.2 g/dL 6.8     Lactate 0.4 - 2.0 mmol/L 0.7     WBC 4.4 - 11.3 x10*3/uL 12.1 (H)  7.2   nRBC 0.0 - 0.0 /100 WBCs 0.0  0.0   RBC 4.50 - 5.90 x10*6/uL 4.76  4.84   HEMOGLOBIN 13.5 - 17.5 g/dL 14.3  14.3   HEMATOCRIT 41.0 - 52.0 % 41.8  43.2   MCV 80 - 100 fL 88  89   MCH 26.0 - 34.0 pg 30.0  29.5 "   MCHC 32.0 - 36.0 g/dL 34.2  33.1   RED CELL DISTRIBUTION WIDTH 11.5 - 14.5 % 12.7  12.9   Platelets 150 - 450 x10*3/uL 301  316     Assessment & Plan  Sara-rectal abscess    Constipation    Hemorrhoids    Alcohol use disorder    Tobacco dependence      Sara-rectal abscess  Constipation  Hemorrhoids  -WBC 12.1 > 7.2  -fever/chills  -CT a/p: Low-density areas around the anus that may represent the patient's known hemorrhoids. The exam is limited by significant motion artifact.   -OR with Dr Hernandez today  -NPO, IVF  -pain meds as needed  -IV unasyn Q6 hours  -trend CBC  -bowel regimen    Alcohol use disorder  Tobacco dependence  -nicotine patch as needed  -CIWA with diazepam as needed    GI ppx: PPI  DVT ppx: ambulation  Fluids: prn  Electrolytes: replace as needed  Nutrition: reg  Adjuncts: PIV  Code Status: full  Pt requires inpatient stay at this time.    Tlaia Lacey APRN-CNP    Attending Attestation:    I was present with the APRN-CNP who participated in the documentation of this note. I have personally seen and re-examined the patient and performed the medical decision-making components (assessment and plan of care). I have reviewed the documentation and verified the findings in the note as written with additions or exceptions as stated in the body of this note.    45-year-old male who has been admitted to the hospital for perirectal abscess.  He has been evaluated by Dr. Hernandez and plan is to get surgical intervention today.  Patient is n.p.o., getting IV fluid, pain medication as needed and is on IV Unasyn every 6 hours.  He denied to having any other symptoms.  Currently his pain is controlled.    Sharad Kendall MD  Internal Medicine.       [1] ampicillin-sulbactam, 3 g, intravenous, q6h  folic acid, 1 mg, oral, Daily  multivitamin, 1 tablet, oral, Daily  nicotine, 1 patch, transdermal, Daily  pantoprazole, 40 mg, oral, Daily before breakfast   Or  pantoprazole, 40 mg, intravenous, Daily before  breakfast  thiamine, 100 mg, oral, Daily     [2]    [3] PRN medications: acetaminophen, calcium carbonate, diazePAM, HYDROmorphone, ketorolac, melatonin, ondansetron **OR** ondansetron, oxyCODONE, sennosides-docusate sodium

## 2025-08-04 NOTE — ANESTHESIA POSTPROCEDURE EVALUATION
Patient: Maciej Felipe    Procedure Summary       Date: 08/04/25 Room / Location: [facility] / [facility]    Anesthesia Start: 1021 Anesthesia Stop: 1100    Procedure: INCISION AND DRAINAGE, ABSCESS, ANORECTAL Diagnosis:       Perianal abscess      Sara-rectal abscess      (Perianal abscess [K61.0])      (Sara-rectal abscess [K61.1])    Surgeons: Jin Hernandez MD Responsible Provider: FELIPE Vizcarra    Anesthesia Type: general ASA Status: 2            Anesthesia Type: general    Vitals Value Taken Time   /74 08/04/25 11:27   Temp 36.1 °C (97 °F) 08/04/25 11:27   Pulse 80 08/04/25 11:27   Resp 19 08/04/25 11:27   SpO2 99 % 08/04/25 11:27       Anesthesia Post Evaluation    Patient location during evaluation: PACU  Patient participation: complete - patient participated  Level of consciousness: awake and alert  Pain score: 0  Pain management: adequate  Multimodal analgesia pain management approach  Airway patency: patent  Two or more strategies used to mitigate risk of obstructive sleep apnea  Cardiovascular status: acceptable and stable  Respiratory status: acceptable and room air  Hydration status: acceptable  Postoperative Nausea and Vomiting: none        There were no known notable events for this encounter.

## 2025-08-04 NOTE — PROGRESS NOTES
08/04/25 1448   Discharge Planning   Living Arrangements Spouse/significant other   Support Systems Spouse/significant other   Assistance Needed patient independent with ADL's, works, drives, lives with spouse and 2 children, patient denies any home going needs.   Type of Residence Private residence   Number of Stairs to Enter Residence 5   Number of Stairs Within Residence 12   Do you have animals or pets at home? Yes   Type of Animals or Pets dog   Who is requesting discharge planning? Patient   Home or Post Acute Services None   Expected Discharge Disposition Home   Does the patient need discharge transport arranged? No

## 2025-08-04 NOTE — CARE PLAN
The patient's goals for the shift include  Pt will tolerate IV abx today     The clinical goals for the shift include Pt will tolerate I&D today    Patient remained safe and stable throughout shift. VS were stable and pt denied pain. Pt had I&D today and tolerated well. IV abx were administered and tolerated well. No other complaints at this time. Pt resting in bed with call light within reach.

## 2025-08-04 NOTE — PROGRESS NOTES
08/04/25 1448   Discharge Planning   Living Arrangements Spouse/significant other   Support Systems Spouse/significant other   Assistance Needed patient independent with ADL's, works, drives, lives with spouse and 2 children, patient denies any home going needs.   Type of Residence Private residence   Number of Stairs to Enter Residence 5   Number of Stairs Within Residence 12   Do you have animals or pets at home? Yes   Type of Animals or Pets dog   Who is requesting discharge planning? Patient   Home or Post Acute Services None   Expected Discharge Disposition Home   Does the patient need discharge transport arranged? No     Spouse will transport home, patient had surgery today with Dr. Hernandez, for abscess in groin. Plan for discharge to home tomorrow with no home going needs.

## 2025-08-04 NOTE — CARE PLAN
The patient's goals for the shift include  resting    The clinical goals for the shift include Patient's CIWAs will remain at 0, pain will remain at 0    Assumed care of patient at 1930. Sitz baths completed as tolerated (Q6H). Patient declined toradol this shift. CIWA remained at 0. VSS. Resting in bed with call light within reach.    Problem: Pain - Adult  Goal: Verbalizes/displays adequate comfort level or baseline comfort level  Outcome: Progressing     Problem: Safety - Adult  Goal: Free from fall injury  Outcome: Progressing     Problem: Discharge Planning  Goal: Discharge to home or other facility with appropriate resources  Outcome: Progressing     Problem: Chronic Conditions and Co-morbidities  Goal: Patient's chronic conditions and co-morbidity symptoms are monitored and maintained or improved  Outcome: Progressing     Problem: Nutrition  Goal: Nutrient intake appropriate for maintaining nutritional needs  Outcome: Progressing

## 2025-08-04 NOTE — CONSULTS
Abdominal Pain  Associated symptoms include constipation.   Constipation  Associated symptoms include abdominal pain.     This is because of a patient with pain and swelling in the perianal area.  This patient was well until approximate 12 days ago when he developed pain and swelling in the perianal area.  This increase in size.  He was seen at an urgent care and thought to maybe possibly have a hemorrhoid.  He has a history of previous hemorrhoid banding and a colonoscopy 1 year ago.  He had increased swelling and pain in the perianal area and came to the emergency room.  He had some fevers and chills.  CT scan confirmed some low-density areas in the region of the anus.  He was admitted to the hospital and overnight developed some drainage that was bloody and purulent from the perianal area.  This appeared to be a ruptured perianal abscess.  He is improved now.  He is had no fevers.  He has been constipated approximate 3 days  Medical History[1]     Current Medications[2]     RX Allergies[3]     Review of Systems  Review of Systems   Gastrointestinal:  Positive for abdominal pain and constipation.       Objective     Vital signs for last 24 hours:  Temp:  [36.2 °C (97.2 °F)-37.1 °C (98.8 °F)] 37.1 °C (98.8 °F)  Heart Rate:  [66-84] 72  Resp:  [16-18] 16  BP: (104-140)/(65-86) 105/65    Intake/Output this shift:  I/O this shift:  In: 1000 [P.O.:800; IV Piggyback:200]  Out: -     Physical Exam  Physical Exam  Vitals reviewed.   Constitutional:       Appearance: Normal appearance.   HENT:      Head: Normocephalic.     Cardiovascular:      Rate and Rhythm: Normal rate and regular rhythm.      Heart sounds: Normal heart sounds.   Pulmonary:      Effort: Pulmonary effort is normal.      Breath sounds: Normal breath sounds.   Abdominal:      General: Abdomen is flat. There is no distension.      Palpations: Abdomen is soft. There is no mass.      Tenderness: There is no abdominal tenderness. There is no guarding.       Comments: Swelling left perianal area.  Obvious drainage with likely ruptured abscess, induration and erythema     Musculoskeletal:         General: Normal range of motion.     Skin:     General: Skin is warm.     Neurological:      General: No focal deficit present.     Psychiatric:         Mood and Affect: Mood normal.         Labs & Radiology      Labs Reviewed   CBC WITH AUTO DIFFERENTIAL - Abnormal       Result Value    WBC 12.1 (*)     nRBC 0.0      RBC 4.76      Hemoglobin 14.3      Hematocrit 41.8      MCV 88      MCH 30.0      MCHC 34.2      RDW 12.7      Platelets 301      Neutrophils % 83.1      Immature Granulocytes %, Automated 0.3      Lymphocytes % 8.3      Monocytes % 7.9      Eosinophils % 0.2      Basophils % 0.2      Neutrophils Absolute 10.08 (*)     Immature Granulocytes Absolute, Automated 0.04      Lymphocytes Absolute 1.01 (*)     Monocytes Absolute 0.96      Eosinophils Absolute 0.02      Basophils Absolute 0.02     COMPREHENSIVE METABOLIC PANEL - Abnormal    Glucose 98      Sodium 130 (*)     Potassium 4.0      Chloride 96 (*)     Bicarbonate 24      Anion Gap 14      Urea Nitrogen 6      Creatinine 0.68      eGFR >90      Calcium 8.7      Albumin 4.1      Alkaline Phosphatase 56      Total Protein 6.8      AST 20      Bilirubin, Total 1.0      ALT 29     LACTATE - Normal    Lactate 0.7      Narrative:     Venipuncture immediately after or during the administration of Metamizole may lead to falsely low results. Testing should be performed immediately prior to Metamizole dosing.   BASIC METABOLIC PANEL   CBC     CT abdomen pelvis w IV contrast 08/03/2025    Narrative  Interpreted By:  Bri Hubbard,  STUDY:  CT ABDOMEN PELVIS W IV CONTRAST;  8/3/2025 1:52 pm    INDICATION:  Signs/Symptoms:Generalized abdominal pain mainly to the epigastric  area however also having rectal pain and pressure..    COMPARISON:  08/26/2024    ACCESSION NUMBER(S):  [id number]    ORDERING CLINICIAN:  BRIAN  STYPA    TECHNIQUE:  CT of the abdomen and pelvis was performed following the intravenous  administration of 75 mL Omnipaque 350. Sagittal and coronal  reconstructions were generated.    FINDINGS:  There is significant motion artifact.    LOWER CHEST:  Unremarkable    ABDOMEN:    LIVER:  Unremarkable    BILE DUCTS:  Unremarkable    GALLBLADDER:  There are no obvious gallstones.    PANCREAS:  Unremarkable    SPLEEN:  Unremarkable    ADRENAL GLANDS:  There are no obvious adrenal masses.    KIDNEYS AND URETERS:  The kidneys are normal in size and not obstructed.    The ureters are normal in caliber.    PELVIS:    BLADDER:  Unremarkable    REPRODUCTIVE ORGANS:  The prostate is visualized.    BOWEL:  Analysis of the bowel is limited by the motion artifact. There is no  significant bowel distention.    The appendix is not obvious.    There are low-density areas in the region of the anus that may  represent the patient's known hemorrhoids.    VESSELS:  The aorta and cava are unremarkable.    PERITONEUM/RETROPERITONEUM/LYMPH NODES:  There is no free air or free fluid.    There is no significant lymphadenopathy.    BONES AND ABDOMINAL WALL:  There is no gross bony abnormality.    COMPARISON OF FINDINGS:  The low-density areas around the anus were not present previously.    Impression  Low-density areas around the anus that may represent the patient's  known hemorrhoids.    The exam is limited by significant motion artifact.    MACRO:  none    Signed by: Bri Hubbard 8/3/2025 2:08 PM  Dictation workstation:   [id number]      Impression  Perianal/perirectal abscess  Plan   Incision and drainage in the operating room 8/4/2025, continue intravenous antibiotics, continue sitz bath's  Risks include, but not limited to pain, infection, bleeding, cardiac, pulmonary, neurologic, locomotor, anesthetic events and other unforeseen complications, including death            [1]   Past Medical History:  Diagnosis Date    Alcohol use  disorder 08/04/2025    Constipation 08/04/2025    Hemorrhoids 08/04/2025    Lung nodules 08/2024    Mucocele of lip     Sara-rectal abscess 08/03/2025    Tobacco dependence 08/04/2025   [2]   Current Facility-Administered Medications:     acetaminophen (Tylenol) tablet 650 mg, 650 mg, oral, q4h PRN, Talia Lacey, APRN-CNP    ampicillin-sulbactam (Unasyn) 3 g in sodium chloride 0.9% 100 mL IV, 3 g, intravenous, q6h, Talia Lacey, APRN-CNP, Stopped at 08/04/25 0411    calcium carbonate (Tums) 500 mg (200 mg elemental) chewable tablet 1 tablet, 500 mg of calcium carbonate, oral, 4x daily PRN, Talia Lacey, APRN-CNP    diazePAM (Valium) tablet 10 mg, 10 mg, oral, q2h PRN, Talia Jimenesasia, APRN-CNP    folic acid (Folvite) tablet 1 mg, 1 mg, oral, Daily, Talia Lacey, APRN-CNP, 1 mg at 08/03/25 1706    HYDROmorphone (Dilaudid) injection 0.5 mg, 0.5 mg, intravenous, q3h PRN, Talia Lacey, APRN-CNP    ketorolac (Toradol) injection 30 mg, 30 mg, intravenous, q6h PRN, Jin Hernandez MD, 30 mg at 08/03/25 1806    melatonin tablet 5 mg, 5 mg, oral, Nightly PRN, Talia Jimenesasia, APRN-CNP    multivitamin tablet 1 tablet, 1 tablet, oral, Daily, Talia Jimenesasia, APRN-CNP, 1 tablet at 08/03/25 1706    nicotine (Nicoderm CQ) 14 mg/24 hr patch 1 patch, 1 patch, transdermal, Daily, Talia Lacey, APRN-CNP, 1 patch at 08/03/25 1706    ondansetron (Zofran) tablet 4 mg, 4 mg, oral, q8h PRN **OR** ondansetron (Zofran) injection 4 mg, 4 mg, intravenous, q8h PRN, Talia Lacey, APRN-CNP    oxyCODONE (Roxicodone) immediate release tablet 5 mg, 5 mg, oral, q4h PRN, Talia Lacey, APRN-CNP    pantoprazole (ProtoNix) EC tablet 40 mg, 40 mg, oral, Daily before breakfast **OR** pantoprazole (Protonix) injection 40 mg, 40 mg, intravenous, Daily before breakfast, Talia Lacey, APRN-CNP    sennosides-docusate sodium (Sara-Colace) 8.6-50 mg per tablet 1 tablet, 1 tablet, oral, Nightly PRN,  TANVIR Covarrubias    thiamine (Vitamin B-1) tablet 100 mg, 100 mg, oral, Daily, TOO Covarrubias-CNP, 100 mg at 08/03/25 1706  [3]   Allergies  Allergen Reactions    Shellfish Derived Cough, Dizziness, Hives, Itching, Rash, Shortness of breath and Swelling

## 2025-08-04 NOTE — ANESTHESIA PROCEDURE NOTES
Airway  Date/Time: 8/4/2025 10:27 AM  Reason: elective    Airway not difficult    Staffing  Performed: CRNA   Authorized by: FELIPE Vizcarra    Performed by: FELIPE Vizcarra  Patient location during procedure: OR    Patient Condition  Indications for airway management: anesthesia  Patient position: sniffing  MILS maintained throughout  Sedation level: deep     Final Airway Details   Preoxygenated: yes  Final airway type: supraglottic airway  Successful airway:   Size: 4  Number of attempts at approach: 1  Number of other approaches attempted: 0

## 2025-08-05 VITALS
SYSTOLIC BLOOD PRESSURE: 118 MMHG | OXYGEN SATURATION: 95 % | RESPIRATION RATE: 16 BRPM | WEIGHT: 193.78 LBS | DIASTOLIC BLOOD PRESSURE: 78 MMHG | TEMPERATURE: 98.6 F | HEART RATE: 58 BPM | BODY MASS INDEX: 28.7 KG/M2 | HEIGHT: 69 IN

## 2025-08-05 LAB
ANION GAP SERPL CALC-SCNC: 12 MMOL/L (ref 10–20)
BUN SERPL-MCNC: 8 MG/DL (ref 6–23)
CALCIUM SERPL-MCNC: 8.6 MG/DL (ref 8.6–10.3)
CHLORIDE SERPL-SCNC: 103 MMOL/L (ref 98–107)
CO2 SERPL-SCNC: 27 MMOL/L (ref 21–32)
CREAT SERPL-MCNC: 0.74 MG/DL (ref 0.5–1.3)
EGFRCR SERPLBLD CKD-EPI 2021: >90 ML/MIN/1.73M*2
ERYTHROCYTE [DISTWIDTH] IN BLOOD BY AUTOMATED COUNT: 12.6 % (ref 11.5–14.5)
GLUCOSE SERPL-MCNC: 96 MG/DL (ref 74–99)
HCT VFR BLD AUTO: 40.7 % (ref 41–52)
HGB BLD-MCNC: 13.6 G/DL (ref 13.5–17.5)
MCH RBC QN AUTO: 29.7 PG (ref 26–34)
MCHC RBC AUTO-ENTMCNC: 33.4 G/DL (ref 32–36)
MCV RBC AUTO: 89 FL (ref 80–100)
NRBC BLD-RTO: 0 /100 WBCS (ref 0–0)
PLATELET # BLD AUTO: 319 X10*3/UL (ref 150–450)
POTASSIUM SERPL-SCNC: 4.3 MMOL/L (ref 3.5–5.3)
RBC # BLD AUTO: 4.58 X10*6/UL (ref 4.5–5.9)
SODIUM SERPL-SCNC: 138 MMOL/L (ref 136–145)
WBC # BLD AUTO: 9 X10*3/UL (ref 4.4–11.3)

## 2025-08-05 PROCEDURE — 2500000004 HC RX 250 GENERAL PHARMACY W/ HCPCS (ALT 636 FOR OP/ED): Mod: IPSPLIT | Performed by: NURSE PRACTITIONER

## 2025-08-05 PROCEDURE — 99239 HOSP IP/OBS DSCHRG MGMT >30: CPT | Performed by: NURSE PRACTITIONER

## 2025-08-05 PROCEDURE — S4991 NICOTINE PATCH NONLEGEND: HCPCS | Mod: IPSPLIT | Performed by: NURSE PRACTITIONER

## 2025-08-05 PROCEDURE — 99024 POSTOP FOLLOW-UP VISIT: CPT | Performed by: SURGERY

## 2025-08-05 PROCEDURE — 2500000002 HC RX 250 W HCPCS SELF ADMINISTERED DRUGS (ALT 637 FOR MEDICARE OP, ALT 636 FOR OP/ED): Mod: IPSPLIT | Performed by: NURSE PRACTITIONER

## 2025-08-05 PROCEDURE — 85027 COMPLETE CBC AUTOMATED: CPT | Mod: IPSPLIT | Performed by: NURSE PRACTITIONER

## 2025-08-05 PROCEDURE — 80048 BASIC METABOLIC PNL TOTAL CA: CPT | Mod: IPSPLIT | Performed by: NURSE PRACTITIONER

## 2025-08-05 PROCEDURE — 36415 COLL VENOUS BLD VENIPUNCTURE: CPT | Mod: IPSPLIT | Performed by: NURSE PRACTITIONER

## 2025-08-05 PROCEDURE — 2500000001 HC RX 250 WO HCPCS SELF ADMINISTERED DRUGS (ALT 637 FOR MEDICARE OP): Mod: IPSPLIT | Performed by: NURSE PRACTITIONER

## 2025-08-05 RX ORDER — OXYCODONE HYDROCHLORIDE 5 MG/1
5 TABLET ORAL EVERY 4 HOURS PRN
Refills: 0 | Status: DISCONTINUED | OUTPATIENT
Start: 2025-08-05 | End: 2025-08-05 | Stop reason: HOSPADM

## 2025-08-05 RX ORDER — AMOXICILLIN 250 MG
2 CAPSULE ORAL DAILY
Qty: 14 TABLET | Refills: 0 | Status: SHIPPED | OUTPATIENT
Start: 2025-08-05 | End: 2025-08-12

## 2025-08-05 RX ORDER — AMOXICILLIN AND CLAVULANATE POTASSIUM 875; 125 MG/1; MG/1
1 TABLET, FILM COATED ORAL 2 TIMES DAILY
Qty: 10 TABLET | Refills: 0 | Status: SHIPPED | OUTPATIENT
Start: 2025-08-05 | End: 2025-08-10

## 2025-08-05 RX ADMIN — THERA TABS 1 TABLET: TAB at 08:50

## 2025-08-05 RX ADMIN — NICOTINE 1 PATCH: 14 PATCH, EXTENDED RELEASE TRANSDERMAL at 08:51

## 2025-08-05 RX ADMIN — PANTOPRAZOLE SODIUM 40 MG: 40 TABLET, DELAYED RELEASE ORAL at 06:44

## 2025-08-05 RX ADMIN — FOLIC ACID 1 MG: 1 TABLET ORAL at 08:50

## 2025-08-05 RX ADMIN — SODIUM CHLORIDE 3 G: 9 INJECTION, SOLUTION INTRAVENOUS at 09:16

## 2025-08-05 RX ADMIN — THIAMINE HCL TAB 100 MG 100 MG: 100 TAB at 08:50

## 2025-08-05 RX ADMIN — SODIUM CHLORIDE 3 G: 9 INJECTION, SOLUTION INTRAVENOUS at 04:20

## 2025-08-05 ASSESSMENT — PAIN - FUNCTIONAL ASSESSMENT
PAIN_FUNCTIONAL_ASSESSMENT: 0-10

## 2025-08-05 ASSESSMENT — LIFESTYLE VARIABLES
HEADACHE, FULLNESS IN HEAD: NOT PRESENT
TOTAL SCORE: 0
VISUAL DISTURBANCES: NOT PRESENT
ORIENTATION AND CLOUDING OF SENSORIUM: ORIENTED AND CAN DO SERIAL ADDITIONS
ANXIETY: NO ANXIETY, AT EASE
TREMOR: NO TREMOR
VISUAL DISTURBANCES: NOT PRESENT
ANXIETY: NO ANXIETY, AT EASE
AGITATION: NORMAL ACTIVITY
TOTAL SCORE: 0
ORIENTATION AND CLOUDING OF SENSORIUM: ORIENTED AND CAN DO SERIAL ADDITIONS
AUDITORY DISTURBANCES: NOT PRESENT
AGITATION: NORMAL ACTIVITY
PAROXYSMAL SWEATS: NO SWEAT VISIBLE
AUDITORY DISTURBANCES: NOT PRESENT
NAUSEA AND VOMITING: NO NAUSEA AND NO VOMITING
HEADACHE, FULLNESS IN HEAD: NOT PRESENT
PAROXYSMAL SWEATS: NO SWEAT VISIBLE
TREMOR: NO TREMOR
NAUSEA AND VOMITING: NO NAUSEA AND NO VOMITING

## 2025-08-05 ASSESSMENT — PAIN SCALES - GENERAL
PAINLEVEL_OUTOF10: 0 - NO PAIN

## 2025-08-05 NOTE — CARE PLAN
The patient's goals for the shift include  resting    The clinical goals for the shift include Patient will continue to have 0/10 pain this shift    Assumed care of patient at 1930. Patient has denied pain throughout shift. Toradol continues to be offered for comfort but declined by patient. Sitz bath completed at 0000 and 0400. CIWAs remain at 0. Tolerating IV Unasyn without complication. Surgical site does have small amount of sanguineous drainage, external exufiber changed x2 to ensure site remains dry and clean after sitz. ABD changed x1. PRN stool softener administered at HS and patient ambulating frequently with bowel sounds audible throughout abdomen, but no bm for x4 days. Patient is resting in bed at this time with call light within reach and denies further needs.

## 2025-08-05 NOTE — PROGRESS NOTES
"Maciej Felipe is a 45 y.o. male on day 1 of admission presenting with Sara-rectal abscess.    Subjective   Doing well status post I&D perirectal abscess.  Underwent sitz bath's overnight.       Objective     Physical Exam  Constitutional:       Appearance: Normal appearance.   Abdominal:      Palpations: Abdomen is soft. There is no mass.      Tenderness: There is no abdominal tenderness. There is no guarding.      Comments: Clean posterior perianal wound         Last Recorded Vitals  Blood pressure 124/84, pulse 61, temperature 36.6 °C (97.9 °F), temperature source Temporal, resp. rate 16, height 1.753 m (5' 9\"), weight 87.9 kg (193 lb 12.6 oz), SpO2 95%.  Intake/Output last 3 Shifts:  I/O last 3 completed shifts:  In: 3060 (34.8 mL/kg) [P.O.:2300; I.V.:260 (3 mL/kg); IV Piggyback:500]  Out: - (0 mL/kg)   Weight: 87.9 kg     Relevant Results      Assessment & Plan  Sara-rectal abscess    Constipation    Hemorrhoids    Alcohol use disorder    Tobacco dependence    Perianal abscess    Plan-DC on oral Augmentin for 5 days.  Continue sitz bath's 4 times a day.  Cover with ABD pad follow-up in office in 4 weeks      Jin Hernandez MD    "

## 2025-08-05 NOTE — DISCHARGE SUMMARY
"Discharge Diagnosis  Sara-rectal abscess         Discharge Meds     Medication List      START taking these medications     sennosides-docusate sodium 8.6-50 mg tablet; Commonly known as:   Sara-Colace; Take 2 tablets by mouth once daily for 7 days. Hold for loose   stools     CONTINUE taking these medications     amoxicillin-clavulanate 875-125 mg tablet; Commonly known as: Augmentin;   Take 1 tablet by mouth 2 times a day for 5 days.   b complex vitamins capsule   docusate sodium 100 mg capsule; Commonly known as: Colace; Take 1   capsule (100 mg) by mouth 2 times a day.   lidocaine HCl-hydrocortison ac 3-0.5 % cream; Apply topically 3 times a   day x 3-5 days   magnesium hydroxide 400 mg/5 mL suspension; Commonly known as: Milk of   Magnesia; Take 30 mL by mouth once daily as needed for constipation for up   to 10 days.   pantoprazole 20 mg EC tablet; Commonly known as: ProtoNix; Take 1 tablet   (20 mg) by mouth once daily. Do not crush, chew, or split.   psyllium 0.4 gram capsule; Commonly known as: Metamucil       Test Results Pending At Discharge  Pending Labs       Order Current Status    Sterile Fluid Culture/Smear Preliminary result            Hospital Course   HPI:\"Maciej Felipe is a 45 y.o. male with past medical history of external hemorrhoids, constipation, tobacco dependence, alcohol use who presented to the emergency department yesterday with complaints of abdominal pain, fever and rectal pain.  He states he was seen at the urgent care and was diagnosed with external hemorrhoids prior to coming in.  He also stated he had not moved his bowels in 2 days due to the severe pain in the rectum.  He has been taking milk of magnesia at home to assist in moving his bowels.  On exam the ED provider found a large, tender, erythematous mass rectally and Dr. Hernandez was consulted.  His ED workup also showed mild leukocytosis, hyponatremia.  CT abdomen pelvis showed low-density areas around the anus.  He was " "subsequently admitted to Medicine for further treatment and evaluation and close monitoring of hemodynamic status. \"    Maciej was admitted to Medicine and treated for sara-rectal abscess and constipation. He was seen by Dr Hernandez and taken to OR for I&D of the ano-rectal abscess. His pain was controlled as well. Chronic medical conditions were also addressed and monitored. Labs were closely monitored. He was discharged in stable condition with the above medication changes and/or additions. Recommendations were made to follow up with pt's PCP in 1-2 weeks.   See full inpatient plan below.     Problem List:  Sara-rectal abscess  Constipation  Hemorrhoids  -WBC 12.1 > 7.2  -fever/chills  -CT a/p: Low-density areas around the anus that may represent the patient's known hemorrhoids. The exam is limited by significant motion artifact.   -OR with Dr Hernandez today  -NPO, IVF  -pain meds as needed  -IV unasyn Q6 hours  -trend CBC  ---bowel regimen, sitz baths and pain control continued at home     Alcohol use disorder  Tobacco dependence  -nicotine patch as needed  -CIWA with diazepam as needed  ---alcohol and tobacco cessation encouraged    Pertinent Physical Exam At Time of Discharge  Physical Exam  Constitutional:       General: He is not in acute distress.     Appearance: Normal appearance. He is not toxic-appearing.   HENT:      Head: Normocephalic and atraumatic.      Mouth/Throat:      Mouth: Mucous membranes are moist.    Eyes:      Extraocular Movements: Extraocular movements intact.      Pupils: Pupils are equal, round, and reactive to light.    Cardiovascular:      Rate and Rhythm: Normal rate and regular rhythm.      Pulses: Normal pulses.      Heart sounds: Normal heart sounds. No murmur heard.     No gallop.   Pulmonary:      Effort: Pulmonary effort is normal. No respiratory distress.      Breath sounds: Normal breath sounds. No wheezing, rhonchi or rales.   Abdominal:      General: Bowel sounds are normal. There is " no distension.      Palpations: Abdomen is soft.      Tenderness: There is no abdominal tenderness. There is no guarding or rebound.    Musculoskeletal:         General: No swelling, tenderness, deformity or signs of injury. Normal range of motion.      Cervical back: Normal range of motion and neck supple.    Skin:     General: Skin is warm and dry.      Capillary Refill: Capillary refill takes less than 2 seconds.      Coloration: Skin is not jaundiced.      Findings: No bruising or rash.    Neurological:      General: No focal deficit present.      Mental Status: He is alert and oriented to person, place, and time. Mental status is at baseline.      Cranial Nerves: No cranial nerve deficit.      Sensory: No sensory deficit.      Motor: No weakness.      Gait: Gait normal.    Psychiatric:         Mood and Affect: Mood normal.         Behavior: Behavior normal.         Thought Content: Thought content normal.         Judgment: Judgment normal.     Patient seen by Dr Kendall on day of discharge.  Stable for discharge to home.  Total cumulative time spent in preparation of this discharge including documentation review, coordination of care with the medical team including PT/SW/care coordinators and treating consultants, discussion with patient and pertinent family members and finalization of prescriptions, follow-up appointments, and this discharge summary was approximately 45 minutes.    Outpatient Follow-Up  Future Appointments   Date Time Provider Department [place]   8/14/2025  3:00 PM Yaritza Vera MD [email or web address]   9/3/2025  2:15 PM Jin Hernandez MD [id number]         TOO Covarrubias-CNP

## 2025-08-06 ENCOUNTER — PATIENT OUTREACH (OUTPATIENT)
Dept: PRIMARY CARE | Facility: CLINIC | Age: 45
End: 2025-08-06
Payer: COMMERCIAL

## 2025-08-06 NOTE — PROGRESS NOTES
Subjective   Patient ID: Maciej Felipe is a 45 y.o. male who presents for No chief complaint on file..    HPI   Pt here for hospital f/u: admitted to [facility] 8/3-8/5 for a perirectal abscess.  He presented to ER with abd pain, fever, rectal pain.  CT scan showed abscess around anus.  It was I&D'd by Dr Hernandez.  Sent home on augmentin, colace, anusol HC, MOM.  He has a f/u with surgeon on 9/3/25.    Medical History[1]  Current Medications[2]    Review of Systems see hpi    Objective   There were no vitals taken for this visit.    Physical Exam    Assessment/Plan   Assessment & Plan  Hospital discharge follow-up         Perianal abscess  S/p I&D, f/u with surgeon as scheduled.                   [1]   Past Medical History:  Diagnosis Date    Alcohol use disorder 08/04/2025    Constipation 08/04/2025    Hemorrhoids 08/04/2025    Lung nodules 08/2024    Mucocele of lip     Sara-rectal abscess 08/03/2025    Tobacco dependence 08/04/2025   [2]   Current Outpatient Medications   Medication Sig Dispense Refill    amoxicillin-clavulanate (Augmentin) 875-125 mg tablet Take 1 tablet by mouth 2 times a day for 5 days. 10 tablet 0    b complex vitamins capsule Take 1 capsule by mouth once daily.      docusate sodium (Colace) 100 mg capsule Take 1 capsule (100 mg) by mouth 2 times a day. 60 capsule 0    lidocaine HCl-hydrocortison ac 3-0.5 % cream Apply topically 3 times a day x 3-5 days 7 g 0    magnesium hydroxide (Milk of Magnesia) 400 mg/5 mL suspension Take 30 mL by mouth once daily as needed for constipation for up to 10 days. 360 mL 0    pantoprazole (ProtoNix) 20 mg EC tablet Take 1 tablet (20 mg) by mouth once daily. Do not crush, chew, or split. 90 tablet 0    psyllium (Metamucil) 0.4 gram capsule Take 5 capsules by mouth 4 times a day.      sennosides-docusate sodium (Sara-Colace) 8.6-50 mg tablet Take 2 tablets by mouth once daily for 7 days. Hold for loose stools 14 tablet 0     No current facility-administered  medications for this visit.

## 2025-08-06 NOTE — PROGRESS NOTES
Discharge Facility: [facility]  Discharge Diagnosis: Sara-rectal abscess   Admission Date: 8/3/2025  Procedure Date: 8/4/2025 Incision and drainage, abscess, anorectal   Discharge Date: 8/5/2025    PCP Appointment Date: Appointment with Yaritza Vera MD (08/14/2025)   Specialist Appointment Date: Appointment with Jin Hernandez MD (09/03/2025) General Surgery  Hospital Encounter and Summary Linked: Yes  ED to Hosp-Admission (Discharged) with Sharad Kendall MD (08/03/2025)     Two attempts were made to reach patient within two business days after discharge. Left voicemail with contact information for patient to call back with any non-emergent questions or concerns.

## 2025-08-08 LAB
BACTERIA FLD CULT: ABNORMAL
BACTERIA FLD CULT: ABNORMAL
GRAM STN SPEC: ABNORMAL
GRAM STN SPEC: ABNORMAL

## 2025-08-11 ENCOUNTER — OFFICE VISIT (OUTPATIENT)
Dept: PRIMARY CARE | Facility: CLINIC | Age: 45
End: 2025-08-11
Payer: COMMERCIAL

## 2025-08-11 VITALS
OXYGEN SATURATION: 98 % | HEART RATE: 72 BPM | BODY MASS INDEX: 30.04 KG/M2 | SYSTOLIC BLOOD PRESSURE: 124 MMHG | WEIGHT: 203.4 LBS | DIASTOLIC BLOOD PRESSURE: 74 MMHG

## 2025-08-11 DIAGNOSIS — K61.0 PERIANAL ABSCESS: ICD-10-CM

## 2025-08-11 DIAGNOSIS — Z09 HOSPITAL DISCHARGE FOLLOW-UP: Primary | ICD-10-CM

## 2025-08-11 PROCEDURE — 99495 TRANSJ CARE MGMT MOD F2F 14D: CPT | Performed by: FAMILY MEDICINE

## 2025-08-11 ASSESSMENT — PATIENT HEALTH QUESTIONNAIRE - PHQ9
1. LITTLE INTEREST OR PLEASURE IN DOING THINGS: NOT AT ALL
2. FEELING DOWN, DEPRESSED OR HOPELESS: NOT AT ALL
SUM OF ALL RESPONSES TO PHQ9 QUESTIONS 1 AND 2: 0

## 2025-08-11 ASSESSMENT — PAIN SCALES - GENERAL: PAINLEVEL_OUTOF10: 0-NO PAIN

## 2025-08-14 ENCOUNTER — APPOINTMENT (OUTPATIENT)
Dept: PRIMARY CARE | Facility: CLINIC | Age: 45
End: 2025-08-14
Payer: COMMERCIAL

## 2025-08-14 ENCOUNTER — APPOINTMENT (OUTPATIENT)
Dept: SURGERY | Facility: CLINIC | Age: 45
End: 2025-08-14
Payer: COMMERCIAL

## 2025-08-14 DIAGNOSIS — Z09 HOSPITAL DISCHARGE FOLLOW-UP: Primary | ICD-10-CM

## 2025-08-14 DIAGNOSIS — K61.0 PERIANAL ABSCESS: ICD-10-CM

## 2025-09-03 ENCOUNTER — APPOINTMENT (OUTPATIENT)
Dept: SURGERY | Facility: CLINIC | Age: 45
End: 2025-09-03
Payer: COMMERCIAL

## (undated) DEVICE — SOLUTION, IRRIGATION, STERILE WATER, 1000 ML, POUR BOTTLE

## (undated) DEVICE — Device

## (undated) DEVICE — SOLUTION, PREP, PVP IODINE, FLIP TOP, 4OZ

## (undated) DEVICE — GLOVE, SURGICAL, PROTEXIS PI , 7.0, PF, LF

## (undated) DEVICE — TRAY, DRY PREP, PREMIUM

## (undated) DEVICE — DRAPE PACK, GENERAL, CUSTOM, GENEVA

## (undated) DEVICE — GLOVE, SURGICAL, PROTEXIS PI BLUE W/NEUTHERA, 7.0, PF, LF

## (undated) DEVICE — SCRUB, APLICARE, 4OZ

## (undated) DEVICE — PAD, GROUNDING, ELECTROSURGICAL, W/9 FT CABLE, POLYHESIVE II, ADULT, LF

## (undated) DEVICE — LUBRICANT, JELLY, STERILE, 4OZ, FLIP TOP

## (undated) DEVICE — 00000 VISIT COUNTER

## (undated) DEVICE — SUTURE, CHROMIC GUT, 3-0, SH 27"

## (undated) DEVICE — SOLUTION, IRRIGATION, SODIUM CHLORIDE 0.9%, 1000 ML, POUR BOTTLE